# Patient Record
Sex: MALE | Race: WHITE | HISPANIC OR LATINO | Employment: FULL TIME | URBAN - METROPOLITAN AREA
[De-identification: names, ages, dates, MRNs, and addresses within clinical notes are randomized per-mention and may not be internally consistent; named-entity substitution may affect disease eponyms.]

---

## 2017-02-25 ENCOUNTER — APPOINTMENT (OUTPATIENT)
Dept: RADIOLOGY | Facility: MEDICAL CENTER | Age: 22
End: 2017-02-25
Attending: EMERGENCY MEDICINE
Payer: COMMERCIAL

## 2017-02-25 ENCOUNTER — HOSPITAL ENCOUNTER (EMERGENCY)
Facility: MEDICAL CENTER | Age: 22
End: 2017-02-25
Attending: EMERGENCY MEDICINE
Payer: COMMERCIAL

## 2017-02-25 VITALS
HEIGHT: 69 IN | RESPIRATION RATE: 16 BRPM | HEART RATE: 90 BPM | OXYGEN SATURATION: 98 % | DIASTOLIC BLOOD PRESSURE: 70 MMHG | BODY MASS INDEX: 20.73 KG/M2 | WEIGHT: 139.99 LBS | TEMPERATURE: 99.5 F | SYSTOLIC BLOOD PRESSURE: 124 MMHG

## 2017-02-25 DIAGNOSIS — S43.005A SHOULDER DISLOCATION, LEFT, INITIAL ENCOUNTER: ICD-10-CM

## 2017-02-25 PROCEDURE — 73030 X-RAY EXAM OF SHOULDER: CPT | Mod: LT

## 2017-02-25 PROCEDURE — 700102 HCHG RX REV CODE 250 W/ 637 OVERRIDE(OP): Mod: EDC | Performed by: EMERGENCY MEDICINE

## 2017-02-25 PROCEDURE — 99284 EMERGENCY DEPT VISIT MOD MDM: CPT | Mod: EDC

## 2017-02-25 PROCEDURE — A9270 NON-COVERED ITEM OR SERVICE: HCPCS | Mod: EDC | Performed by: EMERGENCY MEDICINE

## 2017-02-25 PROCEDURE — 23650 CLTX SHO DSLC W/MNPJ WO ANES: CPT | Mod: EDC

## 2017-02-25 RX ORDER — HYDROCODONE BITARTRATE AND ACETAMINOPHEN 5; 325 MG/1; MG/1
1 TABLET ORAL EVERY 6 HOURS PRN
Qty: 20 TAB | Refills: 0 | Status: SHIPPED | OUTPATIENT
Start: 2017-02-25 | End: 2018-02-16

## 2017-02-25 RX ORDER — HYDROCODONE BITARTRATE AND ACETAMINOPHEN 5; 325 MG/1; MG/1
1 TABLET ORAL ONCE
Status: COMPLETED | OUTPATIENT
Start: 2017-02-25 | End: 2017-02-25

## 2017-02-25 RX ADMIN — HYDROCODONE BITARTRATE AND ACETAMINOPHEN 1 TABLET: 5; 325 TABLET ORAL at 09:46

## 2017-02-25 NOTE — ED AVS SNAPSHOT
Get-n-Post Access Code: [email or web address]  Expires: 3/27/2017 10:39 AM    Your email address is not on file at [facility].  Email Addresses are required for you to sign up for Get-n-Post, please contact [phone] to verify your personal information and to provide your email address prior to attempting to register for Get-n-Post.    Emir Aguilar  [address] [place] [address]    Get-n-Post  A secure, online tool to manage your health information     [facility] Get-[employer]® is a secure, online tool that connects you to your personalized health information from the privacy of your home -- day or night - making it very easy for you to manage your healthcare. Once the activation process is completed, you can even access your medical information using the Get-n-Post katarzyna, which is available for free in the Apple Katarzyna store or Google Play store.     To learn more about Get-n-Post, visit [email or web address]    There are two levels of access available (as shown below):   My Chart Features  [facility] Primary Care Doctor [facility]  Specialists [facility]  Urgent  Care Non-[facility] Primary Care Doctor   Email your healthcare team securely and privately 24/7 X X X    Manage appointments: schedule your next appointment; view details of past/upcoming appointments X      Request prescription refills. X      View recent personal medical records, including lab and immunizations X X X X   View health record, including health history, allergies, medications X X X X   Read reports about your outpatient visits, procedures, consult and ER notes X X X X   See your discharge summary, which is a recap of your hospital and/or ER visit that includes your diagnosis, lab results, and care plan X X  X     How to register for Get-n-Post:  Once your e-mail address has been verified, follow the following steps to sign up for Get-n-Post.     1. Go to  [email or web address]  2. Click on the Sign Up Now box, which takes you to the New Member Sign Up page. You will  need to provide the following information:  a. Enter your [employer] Access Code exactly as it appears at the top of this page. (You will not need to use this code after you’ve completed the sign-up process. If you do not sign up before the expiration date, you must request a new code.)   b. Enter your date of birth.   c. Enter your home email address.   d. Click Submit, and follow the next screen’s instructions.  3. Create a My Pick Boxt ID. This will be your [employer] login ID and cannot be changed, so think of one that is secure and easy to remember.  4. Create a [employer] password. You can change your password at any time.  5. Enter your Password Reset Question and Answer. This can be used at a later time if you forget your password.   6. Enter your e-mail address. This allows you to receive e-mail notifications when new information is available in [employer].  7. Click Sign Up. You can now view your health information.    For assistance activating your [employer] account, call [phone]

## 2017-02-25 NOTE — DISCHARGE INSTRUCTIONS
Shoulder Dislocation  Your shoulder is made up of three bones: the collar bone (clavicle); the shoulder blade (scapula), which includes the socket (glenoid cavity); and the upper arm bone (humerus). Your shoulder joint is the place where these bones meet. Strong, fibrous tissues hold these bones together (ligaments). Muscles and strong, fibrous tissues that connect the muscles to these bones (tendons) allow your arm to move through this joint. The range of motion of your shoulder joint is more extensive than most of your other joints, and the glenoid cavity is very shallow. That is the reason that your shoulder joint is one of the most unstable joints in your body. It is far more prone to dislocation than your other joints. Shoulder dislocation is when your humerus is forced out of your shoulder joint.  CAUSES  Shoulder dislocation is caused by a forceful impact on your shoulder. This impact usually is from an injury, such as a sports injury or a fall.  SYMPTOMS  Symptoms of shoulder dislocation include:  · Deformity of your shoulder.  · Intense pain.  · Inability to move your shoulder joint.  · Numbness, weakness, or tingling around your shoulder joint (your neck or down your arm).  · Bruising or swelling around your shoulder.  DIAGNOSIS  In order to diagnose a dislocated shoulder, your caregiver will perform a physical exam. Your caregiver also may have an X-ray exam done to see if you have any broken bones. Magnetic resonance imaging (MRI) is a procedure that sometimes is done to help your caregiver see any damage to the soft tissues around your shoulder, particularly your rotator cuff tendons. Additionally, your caregiver also may have electromyography done to measure the electrical discharges produced in your muscles if you have signs or symptoms of nerve damage.  TREATMENT  A shoulder dislocation is treated by placing the humerus back in the joint (reduction). Your caregiver does this either manually (closed  reduction), by moving your humerus back into the joint through manipulation, or through surgery (open reduction). When your humerus is back in place, severe pain should improve almost immediately.  You also may need to have surgery if you have a weak shoulder joint or ligaments, and you have recurring shoulder dislocations, despite rehabilitation. In rare cases, surgery is necessary if your nerves or blood vessels are damaged during the dislocation.  After your reduction, your arm will be placed in a shoulder immobilizer or sling to keep it from moving. Your caregiver will have you wear your shoulder immobilizer or sling for 3 days to 3 weeks, depending on how serious your dislocation is. When your shoulder immobilizer or sling is removed, your caregiver may prescribe physical therapy to help improve the range of motion in your shoulder joint.  HOME CARE INSTRUCTIONS   The following measures can help to reduce pain and speed up the healing process:  · Rest your injured joint. Do not move it. Avoid activities similar to the one that caused your injury.  · Apply ice to your injured joint for the first day or two after your reduction or as directed by your caregiver. Applying ice helps to reduce inflammation and pain.  ¨ Put ice in a plastic bag.  ¨ Place a towel between your skin and the bag.  ¨ Leave the ice on for 15-20 minutes at a time, every 2 hours while you are awake.  · Exercise your hand by squeezing a soft ball. This helps to eliminate stiffness and swelling in your hand and wrist.  · Take over-the-counter or prescription medicine for pain or discomfort as told by your caregiver.  SEEK IMMEDIATE MEDICAL CARE IF:   · Your shoulder immobilizer or sling becomes damaged.  · Your pain becomes worse rather than better.  · You lose feeling in your arm or hand, or they become white and cold.  MAKE SURE YOU:   · Understand these instructions.  · Will watch your condition.  · Will get help right away if you are not  doing well or get worse.     This information is not intended to replace advice given to you by your health care provider. Make sure you discuss any questions you have with your health care provider.     Document Released: 09/12/2002 Document Revised: 01/08/2016 Document Reviewed: 04/11/2016  [employer] Interactive Patient Education ©2016 Elsevier Inc.

## 2017-02-25 NOTE — ED AVS SNAPSHOT
2/25/2017          Emir Aguilar  [address] [place] [address]    Dear Emir:    [facility] wants to ensure your discharge home is safe and you or your loved ones have had all your questions answered regarding your care after you leave the hospital.    You may receive a telephone call within two days of your discharge.  This call is to make certain you understand your discharge instructions as well as ensure we provided you with the best care possible during your stay with us.     The call will only last approximately 3-5 minutes and will be done by a nurse.    Once again, we want to ensure your discharge home is safe and that you have a clear understanding of any next steps in your care.  If you have any questions or concerns, please do not hesitate to contact us, we are here for you.  Thank you for choosing [facility] for your healthcare needs.    Sincerely,    Dwight Kowalski    [facility]

## 2017-02-25 NOTE — ED NOTES
Pt rec'd dc instructions about shoulder dislocation and stated understanding.  Rec'd script for norco with timing and dosing amount.  Will follow up with pcp or return for worse symptoms.

## 2017-02-25 NOTE — ED PROVIDER NOTES
"ED Provider Note      CHIEF COMPLAINT   Chief Complaint   Patient presents with   • Shoulder Pain     pt amb to triage, reports stiped by dog this AM. fell onto L shoulder, pt splinting L shoulder, 7/10.  unable to stand still.         HPI   Emir Aguilar is a 21 y.o. male who presents with left shoulder pain. Patient tripped over his dog this morning. Fell on his left shoulder. He reports currently tripping on mushrooms. He has pain over his left shoulder. Throbbing. Worse with any movement. Severe. No numbness tingling weakness. Denies head injury neck pain or back pain. He has dislocated his shoulder previously.    REVIEW OF SYSTEMS   Pertinent negative: As above    PAST MEDICAL HISTORY   Shoulder dislocation    SOCIAL HISTORY  Social History   Substance Use Topics   • Smoking status: Current Every Day Smoker     Types: Cigarettes   • Smokeless tobacco: None   • Alcohol Use: Yes      Comment: occ       ALLERGIES   See chart    PHYSICAL EXAM  VITAL SIGNS: /67 mmHg  Pulse 87  Temp(Src) 36.9 °C (98.4 °F)  Resp 16  Ht 1.753 m (5' 9\")  Wt 63.5 kg (139 lb 15.9 oz)  BMI 20.66 kg/m2  SpO2 98%  Head: Atraumatic  Eyes: Eyes normal inspection  Neck: has full range of motion, normal inspection.  Constitutional: Anxious  Cardiovascular: Normal heart rate. Pulses strong radial  Thorax & Lungs: No respiratory distress  Skin: Intact  Musculoskeletal: Holding left shoulder internally rotated and abducted. There is a sulcus sign consistent with anterior inferior dislocation. Limited range of motion.  Neurologic:  Normal sensory and motor    RADIOLOGY/PROCEDURES  Joint Reduction Procedure Note    Indication: Left shoulder Joint dislocation    Consent: The patient provided verbal consent for this procedure.    Procedure: The pre-reduction exam showed distal perfusion & neurologic function to be normal. The patient was placed in the appropriate position. Reduction of the left shoulder was performed by scapular " manipulation. Post reduction films were obtained and revealed satisfactory reduction. A post-reduction exam revealed distal perfusion & neurologic function to be normal. The affected area was immobilized with a sling.    The patient tolerated the procedure well.    Complications: None      DX-SHOULDER 2+ LEFT   Final Result      1.  There is no gross plain film evidence of dislocation or acute fracture.            Imaging is interpreted by radiologist reviewed by me    COURSE & MEDICAL DECISION MAKING  Patient presents with obvious left shoulder dislocation with history of this. The shoulder is reduced on exam as described above. He had symptomatic improvement. He was given one Norco orally. X-ray of the left shoulder was obtained and was negative. Given recurrent dislocation he is referred to Dr. Ruelas. He is placed in a sling to use for the next 2 days and then range of motion exercises were advised and he is given a prescription for Norco at home. He is to return to the ER for uncontrolled pain or concern.      FINAL IMPRESSION  1. Shoulder dislocation  2. Closed reduction left shoulder dislocation by me      This dictation was created using voice recognition software. The accuracy of the dictation is limited to the abilities of the software. I expect there may be some errors of grammar and possibly content. The nursing notes were reviewed and certain aspects of this information were incorporated into this note.      Electronically signed by: Molina Murillo, 2/25/2017 10:09 AM

## 2017-02-25 NOTE — ED NOTES
Chief Complaint   Patient presents with   • Shoulder Pain     pt amb to triage, reports stiped by dog this AM. fell onto L shoulder, pt splinting L shoulder, 7/10.  unable to stand still.     asked pt to inform staff of any significant changes.  Pt refuses to sit.

## 2017-02-25 NOTE — ED AVS SNAPSHOT
Home Care Instructions                                                                                                                Emir Aguilar   MRN: [id number]    Department:  [facility], Emergency Dept   Date of Visit:  2/25/2017            [facility], Emergency Dept    [address]    [facility] [address]    Phone:  [phone]      You were seen by     Molina Murillo M.D.      Your Diagnosis Was     Shoulder dislocation, left, initial encounter     S43.005A       These are the medications you received during your hospitalization from 02/25/2017 0831 to 02/25/2017 1043     Date/Time Order Dose Route Action    02/25/2017 0946 hydrocodone-acetaminophen (NORCO) 5-325 MG per tablet 1 Tab 1 Tab Oral Given      Follow-up Information     1. Follow up with Giovani Del Rio M.D. In 1 week.    Specialty:  Orthopaedics    Contact information    [address]  [address]  [facility] [id number] [phone]        Medication Information     Review all of your home medications and newly ordered medications with your primary doctor and/or pharmacist as soon as possible. Follow medication instructions as directed by your doctor and/or pharmacist.     Please keep your complete medication list with you and share with your physician. Update the information when medications are discontinued, doses are changed, or new medications (including over-the-counter products) are added; and carry medication information at all times in the event of emergency situations.               Medication List      START taking these medications        Instructions    hydrocodone-acetaminophen 5-325 MG Tabs per tablet   Commonly known as:  NORCO    Take 1 Tab by mouth every 6 hours as needed.   Dose:  1 Tab               Procedures and tests performed during your visit     DX-SHOULDER 2+ LEFT    SLING        Discharge Instructions       Shoulder Dislocation  Your shoulder is made up of three bones: the  collar bone (clavicle); the shoulder blade (scapula), which includes the socket (glenoid cavity); and the upper arm bone (humerus). Your shoulder joint is the place where these bones meet. Strong, fibrous tissues hold these bones together (ligaments). Muscles and strong, fibrous tissues that connect the muscles to these bones (tendons) allow your arm to move through this joint. The range of motion of your shoulder joint is more extensive than most of your other joints, and the glenoid cavity is very shallow. That is the reason that your shoulder joint is one of the most unstable joints in your body. It is far more prone to dislocation than your other joints. Shoulder dislocation is when your humerus is forced out of your shoulder joint.  CAUSES  Shoulder dislocation is caused by a forceful impact on your shoulder. This impact usually is from an injury, such as a sports injury or a fall.  SYMPTOMS  Symptoms of shoulder dislocation include:  · Deformity of your shoulder.  · Intense pain.  · Inability to move your shoulder joint.  · Numbness, weakness, or tingling around your shoulder joint (your neck or down your arm).  · Bruising or swelling around your shoulder.  DIAGNOSIS  In order to diagnose a dislocated shoulder, your caregiver will perform a physical exam. Your caregiver also may have an X-ray exam done to see if you have any broken bones. Magnetic resonance imaging (MRI) is a procedure that sometimes is done to help your caregiver see any damage to the soft tissues around your shoulder, particularly your rotator cuff tendons. Additionally, your caregiver also may have electromyography done to measure the electrical discharges produced in your muscles if you have signs or symptoms of nerve damage.  TREATMENT  A shoulder dislocation is treated by placing the humerus back in the joint (reduction). Your caregiver does this either manually (closed reduction), by moving your humerus back into the joint through  manipulation, or through surgery (open reduction). When your humerus is back in place, severe pain should improve almost immediately.  You also may need to have surgery if you have a weak shoulder joint or ligaments, and you have recurring shoulder dislocations, despite rehabilitation. In rare cases, surgery is necessary if your nerves or blood vessels are damaged during the dislocation.  After your reduction, your arm will be placed in a shoulder immobilizer or sling to keep it from moving. Your caregiver will have you wear your shoulder immobilizer or sling for 3 days to 3 weeks, depending on how serious your dislocation is. When your shoulder immobilizer or sling is removed, your caregiver may prescribe physical therapy to help improve the range of motion in your shoulder joint.  HOME CARE INSTRUCTIONS   The following measures can help to reduce pain and speed up the healing process:  · Rest your injured joint. Do not move it. Avoid activities similar to the one that caused your injury.  · Apply ice to your injured joint for the first day or two after your reduction or as directed by your caregiver. Applying ice helps to reduce inflammation and pain.  ¨ Put ice in a plastic bag.  ¨ Place a towel between your skin and the bag.  ¨ Leave the ice on for 15-20 minutes at a time, every 2 hours while you are awake.  · Exercise your hand by squeezing a soft ball. This helps to eliminate stiffness and swelling in your hand and wrist.  · Take over-the-counter or prescription medicine for pain or discomfort as told by your caregiver.  SEEK IMMEDIATE MEDICAL CARE IF:   · Your shoulder immobilizer or sling becomes damaged.  · Your pain becomes worse rather than better.  · You lose feeling in your arm or hand, or they become white and cold.  MAKE SURE YOU:   · Understand these instructions.  · Will watch your condition.  · Will get help right away if you are not doing well or get worse.     This information is not intended to  replace advice given to you by your health care provider. Make sure you discuss any questions you have with your health care provider.     Document Released: 09/12/2002 Document Revised: 01/08/2016 Document Reviewed: 04/11/2016  Elsevier Interactive Patient Education ©2016 [employer]            Patient Information     Patient Information    Following emergency treatment: all patient requiring follow-up care must return either to a private physician or a clinic if your condition worsens before you are able to obtain further medical attention, please return to the emergency room.     Billing Information    At [facility], we work to make the billing process streamlined for our patients.  Our Representatives are here to answer any questions you may have regarding your hospital bill.  If you have insurance coverage and have supplied your insurance information to us, we will submit a claim to your insurer on your behalf.  Should you have any questions regarding your bill, we can be reached online or by phone as follows:  Online: You are able pay your bills online or live chat with our representatives about any billing questions you may have. We are here to help Monday - Friday from 8:00am to 7:30pm and 9:00am - 12:00pm on Saturdays.  Please visit [email or web address]  for more information.   Phone:  [phone] or [phone]    Please note that your emergency physician, surgeon, pathologist, radiologist, anesthesiologist, and other specialists are not employed by [facility] and will therefore bill separately for their services.  Please contact them directly for any questions concerning their bills at the numbers below:     Emergency Physician Services:  [phone]  [facility]:  [phone]  [facility]:  [phone]  [facility]:  [phone]    1. Your final bill may vary from the amount quoted upon discharge if all procedures  are not complete at that time, or if your doctor has additional procedures of which we are not aware. You will receive an additional bill if you return to the Emergency Department at [facility] for suture removal regardless of the facility of which the sutures were placed.     2. Please arrange for settlement of this account at the emergency registration.    3. All self-pay accounts are due in full at the time of treatment.  If you are unable to meet this obligation then payment is expected within 4-5 days.     4. If you have had radiology studies (CT, X-ray, Ultrasound, MRI), you have received a preliminary result during your emergency department visit. Please contact the radiology department [phone] to receive a copy of your final result. Please discuss the Final result with your primary physician or with the follow up physician provided.     Crisis Hotline:  [place] Crisis Hotline:  6-251-DNFUWRK or 1-280.142.1050  [place] Crisis Hotline:    1-954.846.9777 or 251-931-5817         ED Discharge Follow Up Questions    1. In order to provide you with very good care, we would like to follow up with a phone call in the next few days.  May we have your permission to contact you?     YES /  NO    2. What is the best phone number to call you? (       )_____-__________    3. What is the best time to call you?      Morning  /  Afternoon  /  Evening                   Patient Signature:  ____________________________________________________________    Date:  ____________________________________________________________

## 2017-02-28 ENCOUNTER — HOSPITAL ENCOUNTER (EMERGENCY)
Facility: MEDICAL CENTER | Age: 22
End: 2017-02-28
Attending: EMERGENCY MEDICINE
Payer: COMMERCIAL

## 2017-02-28 VITALS
RESPIRATION RATE: 16 BRPM | SYSTOLIC BLOOD PRESSURE: 115 MMHG | HEART RATE: 88 BPM | WEIGHT: 145 LBS | TEMPERATURE: 98.5 F | BODY MASS INDEX: 21.48 KG/M2 | DIASTOLIC BLOOD PRESSURE: 69 MMHG | OXYGEN SATURATION: 98 % | HEIGHT: 69 IN

## 2017-02-28 DIAGNOSIS — S43.005D SHOULDER DISLOCATION, LEFT, SUBSEQUENT ENCOUNTER: ICD-10-CM

## 2017-02-28 DIAGNOSIS — M25.512 LEFT SHOULDER PAIN, UNSPECIFIED CHRONICITY: ICD-10-CM

## 2017-02-28 PROCEDURE — 99283 EMERGENCY DEPT VISIT LOW MDM: CPT

## 2017-02-28 RX ORDER — HYDROCODONE BITARTRATE AND ACETAMINOPHEN 5; 325 MG/1; MG/1
1-2 TABLET ORAL EVERY 4 HOURS PRN
Qty: 10 TAB | Refills: 0 | Status: SHIPPED | OUTPATIENT
Start: 2017-02-28 | End: 2018-02-16

## 2017-02-28 NOTE — ED AVS SNAPSHOT
Home Care Instructions                                                                                                                Emir Aguilar   MRN: [id number]    Department:  [facility], Emergency Dept   Date of Visit:  2/28/2017            [facility], Emergency Dept    [address]    [facility] [address]    Phone:  [phone]      You were seen by     Jarett Davila M.D.      Your Diagnosis Was     Shoulder dislocation, left, subsequent encounter     S43.005D       Follow-up Information     1. Schedule an appointment as soon as possible for a visit with Giovain Del Rio M.D..    Specialty:  Orthopaedics    Contact information    [address]  [address]  [facility] [address]  [phone]        Medication Information     Review all of your home medications and newly ordered medications with your primary doctor and/or pharmacist as soon as possible. Follow medication instructions as directed by your doctor and/or pharmacist.     Please keep your complete medication list with you and share with your physician. Update the information when medications are discontinued, doses are changed, or new medications (including over-the-counter products) are added; and carry medication information at all times in the event of emergency situations.               Medication List      ASK your doctor about these medications        Instructions    * hydrocodone-acetaminophen 5-325 MG Tabs per tablet   What changed:  Another medication with the same name was added. Make sure you understand how and when to take each.   Commonly known as:  NORCO   Ask about: Which instructions should I use?    Take 1 Tab by mouth every 6 hours as needed.   Dose:  1 Tab       * hydrocodone-acetaminophen 5-325 MG Tabs per tablet   What changed:  You were already taking a medication with the same name, and this prescription was added. Make sure you understand how and when to take each.   Commonly known  as:  NORCO   Ask about: Which instructions should I use?    Take 1-2 Tabs by mouth every four hours as needed.   Dose:  1-2 Tab       * Notice:  This list has 2 medication(s) that are the same as other medications prescribed for you. Read the directions carefully, and ask your doctor or other care provider to review them with you.              Discharge Instructions       Shoulder Pain  The shoulder is the joint that connects your arms to your body. The bones that form the shoulder joint include the upper arm bone (humerus), the shoulder blade (scapula), and the collarbone (clavicle). The top of the humerus is shaped like a ball and fits into a rather flat socket on the scapula (glenoid cavity). A combination of muscles and strong, fibrous tissues that connect muscles to bones (tendons) support your shoulder joint and hold the ball in the socket. Small, fluid-filled sacs (bursae) are located in different areas of the joint. They act as cushions between the bones and the overlying soft tissues and help reduce friction between the gliding tendons and the bone as you move your arm. Your shoulder joint allows a wide range of motion in your arm. This range of motion allows you to do things like scratch your back or throw a ball. However, this range of motion also makes your shoulder more prone to pain from overuse and injury.  Causes of shoulder pain can originate from both injury and overuse and usually can be grouped in the following four categories:  · Redness, swelling, and pain (inflammation) of the tendon (tendinitis) or the bursae (bursitis).  · Instability, such as a dislocation of the joint.  · Inflammation of the joint (arthritis).  · Broken bone (fracture).  HOME CARE INSTRUCTIONS   · Apply ice to the sore area.  ¨ Put ice in a plastic bag.  ¨ Place a towel between your skin and the bag.  ¨ Leave the ice on for 15-20 minutes, 3-4 times per day for the first 2 days, or as directed by your health care  provider.  · Stop using cold packs if they do not help with the pain.  · If you have a shoulder sling or immobilizer, wear it as long as your caregiver instructs. Only remove it to shower or bathe. Move your arm as little as possible, but keep your hand moving to prevent swelling.  · Squeeze a soft ball or foam pad as much as possible to help prevent swelling.  · Only take over-the-counter or prescription medicines for pain, discomfort, or fever as directed by your caregiver.  SEEK MEDICAL CARE IF:   · Your shoulder pain increases, or new pain develops in your arm, hand, or fingers.  · Your hand or fingers become cold and numb.  · Your pain is not relieved with medicines.  SEEK IMMEDIATE MEDICAL CARE IF:   · Your arm, hand, or fingers are numb or tingling.  · Your arm, hand, or fingers are significantly swollen or turn white or blue.  MAKE SURE YOU:   · Understand these instructions.  · Will watch your condition.  · Will get help right away if you are not doing well or get worse.     This information is not intended to replace advice given to you by your health care provider. Make sure you discuss any questions you have with your health care provider.     Document Released: 09/27/2006 Document Revised: 01/08/2016 Document Reviewed: 04/11/2016  [employer] Interactive Patient Education ©2016 [employer]            Patient Information     Patient Information    Following emergency treatment: all patient requiring follow-up care must return either to a private physician or a clinic if your condition worsens before you are able to obtain further medical attention, please return to the emergency room.     Billing Information    At [facility], we work to make the billing process streamlined for our patients.  Our Representatives are here to answer any questions you may have regarding your hospital bill.  If you have insurance coverage and have supplied your insurance information to us, we will submit a claim to your  insurer on your behalf.  Should you have any questions regarding your bill, we can be reached online or by phone as follows:  Online: You are able pay your bills online or live chat with our representatives about any billing questions you may have. We are here to help Monday - Friday from 8:00am to 7:30pm and 9:00am - 12:00pm on Saturdays.  Please visit [email or web address]  for more information.   Phone:  [phone] or [phone]    Please note that your emergency physician, surgeon, pathologist, radiologist, anesthesiologist, and other specialists are not employed by [facility] and will therefore bill separately for their services.  Please contact them directly for any questions concerning their bills at the numbers below:     Emergency Physician Services:  [phone]  [facility]:  [phone]  [facility]:  [phone]  [facility]:  [phone]    1. Your final bill may vary from the amount quoted upon discharge if all procedures are not complete at that time, or if your doctor has additional procedures of which we are not aware. You will receive an additional bill if you return to the Emergency Department at [facility] for suture removal regardless of the facility of which the sutures were placed.     2. Please arrange for settlement of this account at the emergency registration.    3. All self-pay accounts are due in full at the time of treatment.  If you are unable to meet this obligation then payment is expected within 4-5 days.     4. If you have had radiology studies (CT, X-ray, Ultrasound, MRI), you have received a preliminary result during your emergency department visit. Please contact the radiology department [phone] to receive a copy of your final result. Please discuss the Final result with your primary physician or with the follow up physician provided.     Crisis Hotline:  National Crisis Hotline:   0-012-EFWDNOE or 1-455.854.1114  [place] Crisis Hotline:    1-648.780.1439 or 661-746-9988         ED Discharge Follow Up Questions    1. In order to provide you with very good care, we would like to follow up with a phone call in the next few days.  May we have your permission to contact you?     YES /  NO    2. What is the best phone number to call you? (       )_____-__________    3. What is the best time to call you?      Morning  /  Afternoon  /  Evening                   Patient Signature:  ____________________________________________________________    Date:  ____________________________________________________________

## 2017-02-28 NOTE — ED AVS SNAPSHOT
LogicSource Access Code: [email or web address]  Expires: 3/27/2017 10:39 AM    Your email address is not on file at [facility].  Email Addresses are required for you to sign up for [employer], please contact [phone] to verify your personal information and to provide your email address prior to attempting to register for LogicSource.    Emir Aguilar  [address] [place] [address]    LogicSource  A secure, online tool to manage your health information     [employer]’s LogicSource® is a secure, online tool that connects you to your personalized health information from the privacy of your home -- day or night - making it very easy for you to manage your healthcare. Once the activation process is completed, you can even access your medical information using the LogicSource katarzyna, which is available for free in the Apple Katarzyna store or Google Play store.     To learn more about LogicSource, visit [email or web address]    There are two levels of access available (as shown below):   My Chart Features  [facility] Primary Care Doctor [facility]  Specialists [facility]  Urgent  Care Non-[facility] Primary Care Doctor   Email your healthcare team securely and privately 24/7 X X X    Manage appointments: schedule your next appointment; view details of past/upcoming appointments X      Request prescription refills. X      View recent personal medical records, including lab and immunizations X X X X   View health record, including health history, allergies, medications X X X X   Read reports about your outpatient visits, procedures, consult and ER notes X X X X   See your discharge summary, which is a recap of your hospital and/or ER visit that includes your diagnosis, lab results, and care plan X X  X     How to register for LogicSource:  Once your e-mail address has been verified, follow the following steps to sign up for LogicSource.     1. Go to  [email or web address]  2. Click on the Sign Up Now box, which takes you to the New Member Sign Up page. You will  need to provide the following information:  a. Enter your [employer] Access Code exactly as it appears at the top of this page. (You will not need to use this code after you’ve completed the sign-up process. If you do not sign up before the expiration date, you must request a new code.)   b. Enter your date of birth.   c. Enter your home email address.   d. Click Submit, and follow the next screen’s instructions.  3. Create a [employer] medicalt ID. This will be your [employer] login ID and cannot be changed, so think of one that is secure and easy to remember.  4. Create a [employer] password. You can change your password at any time.  5. Enter your Password Reset Question and Answer. This can be used at a later time if you forget your password.   6. Enter your e-mail address. This allows you to receive e-mail notifications when new information is available in [employer].  7. Click Sign Up. You can now view your health information.    For assistance activating your [employer] account, call [phone]

## 2017-02-28 NOTE — ED AVS SNAPSHOT
2/28/2017          Emir Aguilar  [address] [place] [address]    Dear Emir:    [facility] wants to ensure your discharge home is safe and you or your loved ones have had all your questions answered regarding your care after you leave the hospital.    You may receive a telephone call within two days of your discharge.  This call is to make certain you understand your discharge instructions as well as ensure we provided you with the best care possible during your stay with us.     The call will only last approximately 3-5 minutes and will be done by a nurse.    Once again, we want to ensure your discharge home is safe and that you have a clear understanding of any next steps in your care.  If you have any questions or concerns, please do not hesitate to contact us, we are here for you.  Thank you for choosing [facility] for your healthcare needs.    Sincerely,    Dwight Kowalski    [facility]

## 2017-03-01 NOTE — ED NOTES
Discharge instructions provided to pt.  Pt states understanding.  Pt states all questions have been answered.  Copy of discharge provided to pt.  Signed copy in chart.  Prescriptions provided to pt. x1 with work note. Pt states that all personal belongings are in possession. Pt upset with billing for another ER visit and not being cleared to return to work. Finance explaining pt's benefits.

## 2017-03-01 NOTE — ED NOTES
22 y/o male ambulate to triage  Chief Complaint   Patient presents with   • Letter for School/Work     Pt was seen here Saturday for shoulder dislocation.  He needs to know when he can return to work and needs a note.  Pt works repairing computers

## 2017-03-01 NOTE — DISCHARGE INSTRUCTIONS
Shoulder Pain  The shoulder is the joint that connects your arms to your body. The bones that form the shoulder joint include the upper arm bone (humerus), the shoulder blade (scapula), and the collarbone (clavicle). The top of the humerus is shaped like a ball and fits into a rather flat socket on the scapula (glenoid cavity). A combination of muscles and strong, fibrous tissues that connect muscles to bones (tendons) support your shoulder joint and hold the ball in the socket. Small, fluid-filled sacs (bursae) are located in different areas of the joint. They act as cushions between the bones and the overlying soft tissues and help reduce friction between the gliding tendons and the bone as you move your arm. Your shoulder joint allows a wide range of motion in your arm. This range of motion allows you to do things like scratch your back or throw a ball. However, this range of motion also makes your shoulder more prone to pain from overuse and injury.  Causes of shoulder pain can originate from both injury and overuse and usually can be grouped in the following four categories:  · Redness, swelling, and pain (inflammation) of the tendon (tendinitis) or the bursae (bursitis).  · Instability, such as a dislocation of the joint.  · Inflammation of the joint (arthritis).  · Broken bone (fracture).  HOME CARE INSTRUCTIONS   · Apply ice to the sore area.  ¨ Put ice in a plastic bag.  ¨ Place a towel between your skin and the bag.  ¨ Leave the ice on for 15-20 minutes, 3-4 times per day for the first 2 days, or as directed by your health care provider.  · Stop using cold packs if they do not help with the pain.  · If you have a shoulder sling or immobilizer, wear it as long as your caregiver instructs. Only remove it to shower or bathe. Move your arm as little as possible, but keep your hand moving to prevent swelling.  · Squeeze a soft ball or foam pad as much as possible to help prevent swelling.  · Only take  over-the-counter or prescription medicines for pain, discomfort, or fever as directed by your caregiver.  SEEK MEDICAL CARE IF:   · Your shoulder pain increases, or new pain develops in your arm, hand, or fingers.  · Your hand or fingers become cold and numb.  · Your pain is not relieved with medicines.  SEEK IMMEDIATE MEDICAL CARE IF:   · Your arm, hand, or fingers are numb or tingling.  · Your arm, hand, or fingers are significantly swollen or turn white or blue.  MAKE SURE YOU:   · Understand these instructions.  · Will watch your condition.  · Will get help right away if you are not doing well or get worse.     This information is not intended to replace advice given to you by your health care provider. Make sure you discuss any questions you have with your health care provider.     Document Released: 09/27/2006 Document Revised: 01/08/2016 Document Reviewed: 04/11/2016  [employer] Interactive Patient Education ©2016 [employer]

## 2017-03-01 NOTE — ED PROVIDER NOTES
ED Provider Note    CHIEF COMPLAINT  Chief Complaint   Patient presents with   • Letter for School/Work        HPI  Emir Aguilar is a 21 y.o. male who presents to the ED complaining of continued left shoulder pain and needs a letter for work. Patient has had multiple dislocations apparently was seen on Friday the 25th for dislocation of his left shoulder. Patient returns today because they will not let him back to work without some sort of a work note. Patient describes continued pain. He is actually run out of some of his medications and requests a refill as well. Patient denies any tingling, fevers, chills, or any other symptoms.    REVIEW OF SYSTEMS  See HPI for further details. All other systems are negative.     PAST MEDICAL HISTORY  No past medical history on file.    FAMILY HISTORY  No family history on file.  Patient's family history has been discussed and is been found to be noncontributory to his present illness  SOCIAL HISTORY  Social History     Social History   • Marital Status: Single     Spouse Name: N/A   • Number of Children: N/A   • Years of Education: N/A     Social History Main Topics   • Smoking status: Current Every Day Smoker     Types: Cigarettes   • Smokeless tobacco: Not on file   • Alcohol Use: Yes      Comment: occ   • Drug Use: Yes     Special: Oral      Comment: marijuana, mushrooms   • Sexual Activity: No      Comment: he  will be in 9th grade     Other Topics Concern   • Not on file     Social History Narrative    ** Merged History Encounter **                   SURGICAL HISTORY  No past surgical history on file.    CURRENT MEDICATIONS   Home Medications     **Home medications have not yet been reviewed for this encounter**        No current facility-administered medications on file prior to encounter.     Current Outpatient Prescriptions on File Prior to Encounter   Medication Sig Dispense Refill   • hydrocodone-acetaminophen (NORCO) 5-325 MG Tab per tablet Take 1 Tab by mouth  "every 6 hours as needed. 20 Tab 0         ALLERGIES   No Known Allergies    PHYSICAL EXAM  VITAL SIGNS: /69 mmHg  Pulse 88  Temp(Src) 36.9 °C (98.5 °F)  Resp 16  Ht 1.753 m (5' 9\")  Wt 65.772 kg (145 lb)  BMI 21.40 kg/m2  SpO2 98%   Pulse Ox interpretation. Nonhypoxic    Constitutional: Well developed, Well nourished, No acute distress, Non-toxic appearance.   Cardiovascular: Regular rate and rhythm without murmurs gallops or rubs.   Thorax & Lungs: Lungs are clear to auscultation bilaterally, there are no wheezes no rales. Chest wall is nontender.  Abdomen: Soft, nontender nondistended. Bowel sounds are present.   Skin: Warm, Dry, No erythema,   Musculoskeletal: Intact distal pulses, no clubbing, no cyanosis, no edema. Mild tenderness about the head of the humerus, but otherwise no deformities. Distal pulses are intact. I did not raise the patient's arm.  Neurologic: Alert & oriented x 3, Normal motor function, Normal sensory function, No focal deficits noted.         COURSE & MEDICAL DECISION MAKING  Pertinent Labs & Imaging studies reviewed. (See chart for details)  Patient presents for evaluation. Clinically, the patient is stable. Given the patient a letter for work for limited duty until cleared by orthopedic surgeon. I have done a narcotics check on him. He still had 2 prescriptions the last 2 years. I'll give him re-prescription for Norco. Recommend follow-up with the orthopedic surgeon. I was referred to on the last visit.    FINAL IMPRESSION  1. Shoulder dislocation, left, subsequent encounter    2. Left shoulder pain, unspecified chronicity         The patient will return for new or worsening symptoms and is stable at the time of discharge.    The patient is referred to a primary physician for blood pressure management, diabetic screening, and for all other preventative health concerns.    DISPOSITION:  Patient will be discharged home in stable condition.    FOLLOW UP:  Giovani Del Rio, " M.D.  [address]  [address]  [place] [address]  [phone]    Schedule an appointment as soon as possible for a visit        OUTPATIENT MEDICATIONS:  New Prescriptions    HYDROCODONE-ACETAMINOPHEN (NORCO) 5-325 MG TAB PER TABLET    Take 1-2 Tabs by mouth every four hours as needed.                 Electronically signed by: Jarett Davila, 2/28/2017 6:18 PM

## 2017-03-01 NOTE — ED NOTES
Pt provided with work note and rx for pain medication. Pt is to have f/u with ortho outpatient for further medical clearance.

## 2018-02-16 ENCOUNTER — APPOINTMENT (OUTPATIENT)
Dept: RADIOLOGY | Facility: MEDICAL CENTER | Age: 23
End: 2018-02-16
Attending: EMERGENCY MEDICINE
Payer: COMMERCIAL

## 2018-02-16 ENCOUNTER — HOSPITAL ENCOUNTER (EMERGENCY)
Facility: MEDICAL CENTER | Age: 23
End: 2018-02-16
Attending: EMERGENCY MEDICINE
Payer: COMMERCIAL

## 2018-02-16 VITALS
WEIGHT: 148.59 LBS | SYSTOLIC BLOOD PRESSURE: 112 MMHG | TEMPERATURE: 100.1 F | BODY MASS INDEX: 22.01 KG/M2 | HEIGHT: 69 IN | RESPIRATION RATE: 16 BRPM | DIASTOLIC BLOOD PRESSURE: 67 MMHG | HEART RATE: 64 BPM | OXYGEN SATURATION: 98 %

## 2018-02-16 DIAGNOSIS — S43.005A DISLOCATION OF LEFT SHOULDER JOINT, INITIAL ENCOUNTER: ICD-10-CM

## 2018-02-16 PROCEDURE — 73030 X-RAY EXAM OF SHOULDER: CPT | Mod: LT

## 2018-02-16 PROCEDURE — 99285 EMERGENCY DEPT VISIT HI MDM: CPT

## 2018-02-16 PROCEDURE — 96374 THER/PROPH/DIAG INJ IV PUSH: CPT

## 2018-02-16 PROCEDURE — 23650 CLTX SHO DSLC W/MNPJ WO ANES: CPT

## 2018-02-16 PROCEDURE — 700111 HCHG RX REV CODE 636 W/ 250 OVERRIDE (IP): Performed by: EMERGENCY MEDICINE

## 2018-02-16 RX ADMIN — FENTANYL CITRATE 50 MCG: 50 INJECTION, SOLUTION INTRAMUSCULAR; INTRAVENOUS at 10:52

## 2018-02-16 ASSESSMENT — PAIN SCALES - GENERAL
PAINLEVEL_OUTOF10: 2
PAINLEVEL_OUTOF10: 10

## 2018-02-16 NOTE — DISCHARGE INSTRUCTIONS
Shoulder Dislocation  Your shoulder is made up of three bones: the collar bone (clavicle); the shoulder blade (scapula), which includes the socket (glenoid cavity); and the upper arm bone (humerus). Your shoulder joint is the place where these bones meet. Strong, fibrous tissues hold these bones together (ligaments). Muscles and strong, fibrous tissues that connect the muscles to these bones (tendons) allow your arm to move through this joint. The range of motion of your shoulder joint is more extensive than most of your other joints, and the glenoid cavity is very shallow. That is the reason that your shoulder joint is one of the most unstable joints in your body. It is far more prone to dislocation than your other joints. Shoulder dislocation is when your humerus is forced out of your shoulder joint.  CAUSES  Shoulder dislocation is caused by a forceful impact on your shoulder. This impact usually is from an injury, such as a sports injury or a fall.  SYMPTOMS  Symptoms of shoulder dislocation include:  · Deformity of your shoulder.  · Intense pain.  · Inability to move your shoulder joint.  · Numbness, weakness, or tingling around your shoulder joint (your neck or down your arm).  · Bruising or swelling around your shoulder.  DIAGNOSIS  In order to diagnose a dislocated shoulder, your caregiver will perform a physical exam. Your caregiver also may have an X-ray exam done to see if you have any broken bones. Magnetic resonance imaging (MRI) is a procedure that sometimes is done to help your caregiver see any damage to the soft tissues around your shoulder, particularly your rotator cuff tendons. Additionally, your caregiver also may have electromyography done to measure the electrical discharges produced in your muscles if you have signs or symptoms of nerve damage.  TREATMENT  A shoulder dislocation is treated by placing the humerus back in the joint (reduction). Your caregiver does this either manually (closed  reduction), by moving your humerus back into the joint through manipulation, or through surgery (open reduction). When your humerus is back in place, severe pain should improve almost immediately.  You also may need to have surgery if you have a weak shoulder joint or ligaments, and you have recurring shoulder dislocations, despite rehabilitation. In rare cases, surgery is necessary if your nerves or blood vessels are damaged during the dislocation.  After your reduction, your arm will be placed in a shoulder immobilizer or sling to keep it from moving. Your caregiver will have you wear your shoulder immobilizer or sling for 3 days to 3 weeks, depending on how serious your dislocation is. When your shoulder immobilizer or sling is removed, your caregiver may prescribe physical therapy to help improve the range of motion in your shoulder joint.  HOME CARE INSTRUCTIONS   The following measures can help to reduce pain and speed up the healing process:  · Rest your injured joint. Do not move it. Avoid activities similar to the one that caused your injury.  · Apply ice to your injured joint for the first day or two after your reduction or as directed by your caregiver. Applying ice helps to reduce inflammation and pain.  ¨ Put ice in a plastic bag.  ¨ Place a towel between your skin and the bag.  ¨ Leave the ice on for 15-20 minutes at a time, every 2 hours while you are awake.  · Exercise your hand by squeezing a soft ball. This helps to eliminate stiffness and swelling in your hand and wrist.  · Take over-the-counter or prescription medicine for pain or discomfort as told by your caregiver.  SEEK IMMEDIATE MEDICAL CARE IF:   · Your shoulder immobilizer or sling becomes damaged.  · Your pain becomes worse rather than better.  · You lose feeling in your arm or hand, or they become white and cold.  MAKE SURE YOU:   · Understand these instructions.  · Will watch your condition.  · Will get help right away if you are not  doing well or get worse.     This information is not intended to replace advice given to you by your health care provider. Make sure you discuss any questions you have with your health care provider.     Document Released: 09/12/2002 Document Revised: 01/08/2016 Document Reviewed: 04/11/2016  [employer] Interactive Patient Education ©2016 [employer] Inc.

## 2018-02-16 NOTE — ED NOTES
Pt discharged to home. Pt was given follow up instructions. Pt verbalized understanding of all instructions for discharge and is ambulatory out of ED with steady gait, family driving home.

## 2018-02-16 NOTE — ED PROVIDER NOTES
"ED Provider Note      CHIEF COMPLAINT   Chief Complaint   Patient presents with   • Shoulder Pain       HPI   Emir Aguilar is a 22 y.o. male who presents with left shoulder pain. She has dislocated his shoulder 5 times. Today he woke up and felt pain in his left shoulder. Throbbing nonradiating constant worse with movement. He noticed deformity and comes to the ER.      REVIEW OF SYSTEMS   Pertinent negative: No numbness, tingling, weakness    PAST MEDICAL HISTORY   Shoulder dislocation    SOCIAL HISTORY  Social History   Substance Use Topics   • Smoking status: Current Every Day Smoker     Types: Cigarettes   • Smokeless tobacco: Not on file   • Alcohol use Yes      Comment: occ       ALLERGIES   See chart    PHYSICAL EXAM  VITAL SIGNS: /67   Pulse 64   Temp 37.8 °C (100.1 °F)   Resp 16   Ht 1.753 m (5' 9\")   Wt 67.4 kg (148 lb 9.4 oz)   SpO2 98%   BMI 21.94 kg/m²   Head: Atraumatic  Eyes: Eyes normal inspection  Neck: has full range of motion, normal inspection.  Constitutional: No acute distress   Cardiovascular: Normal heart rate. Pulses strong radial  Thorax & Lungs: No respiratory distress  Skin: Intact  Musculoskeletal: Obvious deformity with anterior left shoulder dislocation. Normal range of motion of the other extremities Compartments soft.  Neurologic:  Normal sensory and motor    RADIOLOGY/PROCEDURES  DX-SHOULDER 2+ LEFT   Final Result      No radiographic evidence of acute traumatic injury or dislocation.         Imaging is interpreted by radiologist reviewed by me      Joint Reduction Procedure Note    Indication: Joint dislocation    Consent: The patient was counseled regarding the procedure, it's indications, risks, potential complications and alternatives and any questions were answered. Consent was obtained.    Procedure: The pre-reduction exam showed distal perfusion & neurologic function to be normal. The patient was placed in the prone position. With the left arm hanging off of " the bed. Weight was applied to the left arm. The scapula was manipulated. The shoulder easily reduced. Patient felt improved.    The patient tolerated the procedure well.    Complications: None        COURSE & MEDICAL DECISION MAKING  Analgesia for possible fracture-fentanyl    Patient presented with obvious recurrent left shoulder dislocation.    I'm familiar with the patient. I've seen him in the past and he responded well to scapular manipulation.    Patient was given fentanyl IV. His shoulder was reduced as above. Postprocedure x-ray shows appropriate position.    He has had recurrent dislocations. He has never followed up with orthopedics. I stressed that he should follow up with orthopedics or this will continue to occur. He was referred to Dr. Salinas who is on-call      FINAL IMPRESSION  1. Left shoulder dislocation  2. Closed reduction left shoulder dislocation by me      This dictation was created using voice recognition software. The accuracy of the dictation is limited to the abilities of the software. I expect there may be some errors of grammar and possibly content. The nursing notes were reviewed and certain aspects of this information were incorporated into this note.      Electronically signed by: Molina Murillo, 2/16/2018 11:52 AM

## 2018-09-07 ENCOUNTER — HOSPITAL ENCOUNTER (EMERGENCY)
Facility: MEDICAL CENTER | Age: 23
End: 2018-09-07
Attending: EMERGENCY MEDICINE
Payer: COMMERCIAL

## 2018-09-07 ENCOUNTER — APPOINTMENT (OUTPATIENT)
Dept: RADIOLOGY | Facility: MEDICAL CENTER | Age: 23
End: 2018-09-07
Attending: EMERGENCY MEDICINE
Payer: COMMERCIAL

## 2018-09-07 VITALS
OXYGEN SATURATION: 98 % | HEART RATE: 88 BPM | SYSTOLIC BLOOD PRESSURE: 128 MMHG | WEIGHT: 154.32 LBS | TEMPERATURE: 98.6 F | HEIGHT: 69 IN | DIASTOLIC BLOOD PRESSURE: 79 MMHG | BODY MASS INDEX: 22.86 KG/M2 | RESPIRATION RATE: 18 BRPM

## 2018-09-07 DIAGNOSIS — S43.005A DISLOCATION OF LEFT SHOULDER JOINT, INITIAL ENCOUNTER: ICD-10-CM

## 2018-09-07 PROCEDURE — 700101 HCHG RX REV CODE 250

## 2018-09-07 PROCEDURE — 96375 TX/PRO/DX INJ NEW DRUG ADDON: CPT

## 2018-09-07 PROCEDURE — 304561 HCHG STAT O2

## 2018-09-07 PROCEDURE — 700111 HCHG RX REV CODE 636 W/ 250 OVERRIDE (IP): Performed by: EMERGENCY MEDICINE

## 2018-09-07 PROCEDURE — 73030 X-RAY EXAM OF SHOULDER: CPT | Mod: LT

## 2018-09-07 PROCEDURE — 23650 CLTX SHO DSLC W/MNPJ WO ANES: CPT

## 2018-09-07 PROCEDURE — 96374 THER/PROPH/DIAG INJ IV PUSH: CPT

## 2018-09-07 PROCEDURE — 700105 HCHG RX REV CODE 258: Performed by: EMERGENCY MEDICINE

## 2018-09-07 PROCEDURE — 73020 X-RAY EXAM OF SHOULDER: CPT | Mod: LT

## 2018-09-07 PROCEDURE — 99285 EMERGENCY DEPT VISIT HI MDM: CPT

## 2018-09-07 RX ORDER — MIDAZOLAM HYDROCHLORIDE 1 MG/ML
1 INJECTION INTRAMUSCULAR; INTRAVENOUS ONCE
Status: COMPLETED | OUTPATIENT
Start: 2018-09-07 | End: 2018-09-07

## 2018-09-07 RX ORDER — SODIUM CHLORIDE 9 MG/ML
1000 INJECTION, SOLUTION INTRAVENOUS ONCE
Status: COMPLETED | OUTPATIENT
Start: 2018-09-07 | End: 2018-09-07

## 2018-09-07 RX ORDER — ONDANSETRON 2 MG/ML
4 INJECTION INTRAMUSCULAR; INTRAVENOUS ONCE
Status: COMPLETED | OUTPATIENT
Start: 2018-09-07 | End: 2018-09-07

## 2018-09-07 RX ORDER — KETAMINE HYDROCHLORIDE 50 MG/ML
70 INJECTION, SOLUTION INTRAMUSCULAR; INTRAVENOUS ONCE
Status: COMPLETED | OUTPATIENT
Start: 2018-09-07 | End: 2018-09-07

## 2018-09-07 RX ORDER — KETAMINE HYDROCHLORIDE 50 MG/ML
INJECTION, SOLUTION INTRAMUSCULAR; INTRAVENOUS
Status: COMPLETED
Start: 2018-09-07 | End: 2018-09-07

## 2018-09-07 RX ORDER — HYDROMORPHONE HYDROCHLORIDE 2 MG/ML
0.5 INJECTION, SOLUTION INTRAMUSCULAR; INTRAVENOUS; SUBCUTANEOUS ONCE
Status: COMPLETED | OUTPATIENT
Start: 2018-09-07 | End: 2018-09-07

## 2018-09-07 RX ADMIN — HYDROMORPHONE HYDROCHLORIDE 0.5 MG: 2 INJECTION INTRAMUSCULAR; INTRAVENOUS; SUBCUTANEOUS at 19:55

## 2018-09-07 RX ADMIN — SODIUM CHLORIDE 1000 ML: 9 INJECTION, SOLUTION INTRAVENOUS at 22:00

## 2018-09-07 RX ADMIN — KETAMINE HYDROCHLORIDE 70 MG: 50 INJECTION, SOLUTION INTRAMUSCULAR; INTRAVENOUS at 21:15

## 2018-09-07 RX ADMIN — MIDAZOLAM HYDROCHLORIDE 1 MG: 1 INJECTION, SOLUTION INTRAMUSCULAR; INTRAVENOUS at 21:12

## 2018-09-07 RX ADMIN — FENTANYL CITRATE 50 MCG: 50 INJECTION INTRAMUSCULAR; INTRAVENOUS at 21:11

## 2018-09-07 RX ADMIN — ONDANSETRON 4 MG: 2 INJECTION INTRAMUSCULAR; INTRAVENOUS at 19:55

## 2018-09-07 RX ADMIN — FENTANYL CITRATE 50 MCG: 50 INJECTION, SOLUTION INTRAMUSCULAR; INTRAVENOUS at 21:14

## 2018-09-07 RX ADMIN — KETAMINE HYDROCHLORIDE 70 MG: 50 INJECTION, SOLUTION, CONCENTRATE INTRAMUSCULAR; INTRAVENOUS at 21:15

## 2018-09-07 ASSESSMENT — LIFESTYLE VARIABLES: DO YOU DRINK ALCOHOL: NO

## 2018-09-07 ASSESSMENT — PAIN SCALES - GENERAL: PAINLEVEL_OUTOF10: 6

## 2018-09-07 NOTE — LETTER
"  FORM C-4:  EMPLOYEE’S CLAIM FOR COMPENSATION/ REPORT OF INITIAL TREATMENT  EMPLOYEE’S CLAIM - PROVIDE ALL INFORMATION REQUESTED   First Name  Emir Last Name  Jeff Birthdate             Age  1995 23 y.o. Sex  male Claim Number   Home Employee Address  [address]   [facility]                                     Zip  [address] Height  1.753 m (5' 9\") Weight  70 kg (154 lb 5.2 oz) [facility]     Mailing Employee Address                           [address]   [facility]               Zip  [address] Telephone  [phone] (home)  Primary Language Spoken  ENGLISH   Insurer  [employer] Third [employer]   [employer] CLAIM[employer] Employee's Occupation (Job Title) When Injury or Occupational Disease Occurred     Employer's Name  [employer] Telephone  [phone]    Employer Address  [address] [facility] [address]] Zip  [address]   Date of Injury  9/7/2018       Hour of Injury  6:15 PM Date Employer Notified  9/7/2018 Last Day of Work after Injury or Occupational Disease  9/7/2018 Supervisor to Whom Injury Reported  Jennifer   Address or Location of Accident (if applicable)  [place]   What were you doing at the time of accident? (if applicable)  putting up my tape gun    How did this injury or occupational disease occur? Be specific and answer in detail. Use additional sheet if necessary)  I was putting back my tape gun on the shelf and sneezed. My shoulder popped out   If you believe that you have an occupational disease, when did you first have knowledge of the disability and it relationship to your employment?  N/A Witnesses to the Accident  none     Nature of Injury or Occupational Disease  Dislocation  Part(s) of Body Injured or Affected  Shoulder (L), N/A, N/A    I certify that the above is true and correct to the best of my knowledge and that I have provided this information in order to obtain the " benefits of [place]  I hereby authorize any physician, chiropractor, surgeon, practitioner, or other person, any hospital, including [facility] or [facility], any medical service organization, any insurance company, or other institution or organization to release to each other, any medical or other information, including benefits paid or payable, pertinent to this injury or disease, except information relative to diagnosis, treatment and/or counseling for AIDS, psychological conditions, alcohol or controlled substances, for which I must give specific authorization.  A Photostat of this authorization shall be as valid as the original.   Date  9/7/18 Place  [facility] Employee’s Signature   THIS REPORT MUST BE COMPLETED AND MAILED WITHIN 3 WORKING DAYS OF TREATMENT   Place  [facility], EMERGENCY DEPT  Name of Facility   [facility]   Date  9/7/2018 Diagnosis  (S43.005A) Dislocation of left shoulder joint, initial encounter Is there evidence the injured employee was under the influence of alcohol and/or another controlled substance at the time of accident?   Hour  11:25 PM Description of Injury or Disease  Dislocation of left shoulder joint, initial encounter No   Treatment  Shoulder reduction  Have you advised the patient to remain off work five days or more?         No   X-Ray Findings  Positive  Comments:Left shoulder anterior dislocation   If Yes   From Date    To Date      From information given by the employee, together with medical evidence, can you directly connect this injury or occupational disease as job incurred?  Yes If No, is the employee capable of: Full Duty  No Modified Duty  Yes   Is additional medical care by a physician indicated?  Yes If Modified Duty, Specify any Limitations / Restrictions  Light duty     Do you  "know of any previous injury or disease contributing to this condition or occupational disease?  No   Date  9/7/2018 Print Doctor’s Name  Whit Lion certify the employer’s copy of this form was mailed on:   Address  [address] [facility] [address] [phone] Insurer’s Use Only   [facility]  [address]    Provider’s Tax ID Number  [id number] Telephone  Dept: [phone]    Doctor’s Signature  e-WHIT Roberts M.D. Degree   MD    Original - TREATING PHYSICIAN OR CHIROPRACTOR   Pg 2-Insurer/TPA   Pg 3-Employer   Pg 4-Employee                                                                                                  Form C-4 (rev01/03)     BRIEF DESCRIPTION OF RIGHTS AND BENEFITS  (Pursuant to [place] 616C.050)    Notice of Injury or Occupational Disease (Incident Report Form C-1): If an injury or occupational disease (OD) arises out of and in the course of employment, you must provide written notice to your employer as soon as practicable, but no later than 7 days after the accident or OD. Your employer shall maintain a sufficient supply of the required forms.  Claim for Compensation (Form C-4): If medical treatment is sought, the form C-4 is available at the place of initial treatment. A completed \"Claim for Compensation\" (Form C-4) must be filed within 90 days after an accident or OD. The treating physician or chiropractor must, within 3 working days after treatment, complete and mail to the employer, the employer's insurer and third-party , the Claim for Compensation.  Medical Treatment: If you require medical treatment for your on-the-job injury or OD, you may be required to select a physician or chiropractor from a list provided by your workers’ compensation insurer, if it has contracted with an Organization for Managed Care (MCO) or Preferred Provider Organization (PPO) or providers of health care. If your employer has not entered into a contract with an MCO or " PPO, you may select a physician or chiropractor from the Panel of Physicians and Chiropractors. Any medical costs related to your industrial injury or OD will be paid by your insurer.  Temporary Total Disability (TTD): If your doctor has certified that you are unable to work for a period of at least 5 consecutive days, or 5 cumulative days in a 20-day period, or places restrictions on you that your employer does not accommodate, you may be entitled to TTD compensation.  Temporary Partial Disability (TPD): If the wage you receive upon reemployment is less than the compensation for TTD to which you are entitled, the insurer may be required to pay you TPD compensation to make up the difference. TPD can only be paid for a maximum of 24 months.  Permanent Partial Disability (PPD): When your medical condition is stable and there is an indication of a PPD as a result of your injury or OD, within 30 days, your insurer must arrange for an evaluation by a rating physician or chiropractor to determine the degree of your PPD. The amount of your PPD award depends on the date of injury, the results of the PPD evaluation and your age and wage.  Permanent Total Disability (PTD): If you are medically certified by a treating physician or chiropractor as permanently and totally disabled and have been granted a PTD status by your insurer, you are entitled to receive monthly benefits not to exceed 66 2/3% of your average monthly wage. The amount of your PTD payments is subject to reduction if you previously received a PPD award.  Vocational Rehabilitation Services: You may be eligible for vocational rehabilitation services if you are unable to return to the job due to a permanent physical impairment or permanent restrictions as a result of your injury or occupational disease.  Transportation and Per Fabian Reimbursement: You may be eligible for travel expenses and per fabian associated with medical treatment.  Reopening: You may be able to  reopen your claim if your condition worsens after claim closure.  Appeal Process: If you disagree with a written determination issued by the insurer or the insurer does not respond to your request, you may appeal to the Department of Administration, , by following the instructions contained in your determination letter. You must appeal the determination within 70 days from the date of the determination letter at [address], [address], [place], [place] [address], or [address], [place], [place] [address]. If you disagree with the  decision, you may appeal to the Department of Administration, . You must file your appeal within 30 days from the date of the  decision letter at [address], [address], [place], [place] [address], or [address] [facility] [address], [place], [place] [address]. If you disagree with a decision of an , you may file a petition for judicial review with the District Court. You must do so within 30 days of the Appeal Officer’s decision. You may be represented by an  at your own expense or you may contact the [facility] for possible representation.  [place]  for Injured Workers (NAIW): If you disagree with a  decision, you may request that NAIW represent you without charge at an  Hearing. For information regarding denial of benefits, you may contact the [facility] at: [address], [address], [place] [address], [phone], or [address], [place] [address], [phone]  To File a Complaint with the Division: If you wish to file a complaint with the  of the Division of Industrial Relations (DIR), please contact the Workers’ Compensation Section, [address], [address] [facility] [address], [place], [place] [address], telephone [phone], or [address], [place], [place] [address],  telephone [phone].  For assistance with Workers’ Compensation Issues: you may contact the Office of the Governor Consumer Health Assistance, [address] [place] [address], [address] [facility] [address] [facility] [address], [place] [address], Toll Free 1-940.442.1056, Web site: http://[employer].[facility].nv., E-mail rudolph@[facility].nv.                                                                                                                                                                               __________________________________________________________________                                    _________________            Employee Name / Signature                                                                                                                            Date                                       D-2 (rev. 10/07)

## 2018-09-08 NOTE — DISCHARGE INSTRUCTIONS
You were seen in the ER for a shoulder dislocation. The dislocation was reduced and we have placed you in a sling. You are safe for discharge. It is important that you follow up with an orthopedic surgeon and I have spoken with Dr. Haney, our on-call orthopedist who will see you in his office. Please call on Monday to make an appointment. Keep the sling in place until you follow up in his office. Return to the ER with new or worsening symptoms.    Shoulder Dislocation  A shoulder dislocation happens when the upper arm bone (humerus) moves out of the shoulder joint. The shoulder joint is the part of the shoulder where the humerus, shoulder blade (scapula), and collarbone (clavicle) meet.  What are the causes?  This condition is often caused by:  · A fall.  · A hit to the shoulder.  · A forceful movement of the shoulder.  What increases the risk?  This condition is more likely to develop in people who play sports.  What are the signs or symptoms?  Symptoms of this condition include:  · Deformity of the shoulder.  · Intense pain.  · Inability to move the shoulder.  · Numbness, weakness, or tingling in your neck or down your arm.  · Bruising or swelling around your shoulder.  How is this diagnosed?  This condition is diagnosed with a physical exam. After the exam, tests may be done to check for related problems. Tests that may be done include:  · X-ray. This may be done to check for broken bones.  · MRI. This may be done to check for damage to the tissues around the shoulder.  · Electromyogram. This may be done to check for nerve damage.  How is this treated?  This condition is treated with a procedure to place the humerus back in the joint. This procedure is called a reduction. There are two types of reduction:  · Closed reduction. In this procedure, the humerus is placed back in the joint without surgery. The health care provider uses his or her hands to guide the bone back into place.  · Open reduction. In this  procedure, the humerus is placed back in the joint with surgery. An open reduction may be recommended if:  ¨ You have a weak shoulder joint or weak ligaments.  ¨ You have had more than one shoulder dislocation.  ¨ The nerves or blood vessels around your shoulder have been damaged.  After the humerus is placed back into the joint, your arm will be placed in a splint or sling to prevent it from moving. You will need to wear the splint or sling until your shoulder heals. When the splint or sling is removed, you may have physical therapy to help improve the range of motion in your shoulder joint.  Follow these instructions at home:  If you have a splint or sling:  · Wear it as told by your health care provider. Remove it only as told by your health care provider.  · Loosen it if your fingers become numb and tingle, or if they turn cold and blue.  · Keep it clean and dry.  Bathing  · Do not take baths, swim, or use a hot tub until your health care provider approves. Ask your health care provider if you can take showers. You may only be allowed to take sponge baths for bathing.  · If your health care provider approves bathing and showering, cover your splint or sling with a watertight plastic bag to protect it from water. Do not let the splint or sling get wet.  Managing pain, stiffness, and swelling  · If directed, apply ice to the injured area.  ¨ Put ice in a plastic bag.  ¨ Place a towel between your skin and the bag.  ¨ Leave the ice on for 20 minutes, 2-3 times per day.  · Move your fingers often to avoid stiffness and to decrease swelling.  · Raise (elevate) the injured area above the level of your heart while you are sitting or lying down.  Driving  · Do not drive while wearing a splint or sling on a hand that you use for driving.  · Do not drive or operate heavy machinery while taking pain medicine.  Activity  · Return to your normal activities as told by your health care provider. Ask your health care provider  what activities are safe for you.  · Perform range-of-motion exercises only as told by your health care provider.  · Exercise your hand by squeezing a soft ball. This helps to decrease stiffness and swelling in your hand and wrist.  General instructions  · Take over-the-counter and prescription medicines only as told by your health care provider.  · Do not use any tobacco products, including cigarettes, chewing tobacco, or e-cigarettes. Tobacco can delay bone and tissue healing. If you need help quitting, ask your health care provider.  · Keep all follow-up visits as told by your health care provider. This is important.  Contact a health care provider if:  · Your splint or sling gets damaged.  Get help right away if:  · Your pain gets worse rather than better.  · You lose feeling in your arm or hand.  · Your arm or hand becomes white and cold.  This information is not intended to replace advice given to you by your health care provider. Make sure you discuss any questions you have with your health care provider.  Document Released: 09/12/2002 Document Revised: 08/13/2017 Document Reviewed: 04/11/2016  [employer] Interactive Patient Education © 2017 [employer]

## 2018-09-08 NOTE — ED TRIAGE NOTES
Pt bib remsa from work, pt attempting to place a tool on the rack and sneezed, felt a pop in left shoulder that feels similar to previous dislocations. Pt states he has dislocated left shoulder approx 6x in the past. EMS medicated with 175mcg fentanyl and placed IV. Pt c/o 6/10 pain. Vss. Updated on ed process. No further needs from pt at this time.

## 2018-09-08 NOTE — ED PROVIDER NOTES
"ED Provider Note    Scribed for Collin Lion M.D. by Justin Mohr. 9/7/2018, 7:48 PM.    Primary care provider: Pcp Pt States None  Means of arrival: Shyla  History obtained from: Patient  History limited by: None    CHIEF COMPLAINT  Left Shoulder Pain    HPI  Emir Aguilar is a 23 y.o. left-hand-dominant male with a history of multiple shoulder dislocations who presents to the Emergency Department complaining of left shoulder pain onset earlier today while he was at work. He was on a rack reaching for a tape gun six feet above him when he sneezed and hit his left hand on a shelf. He felt a pop in his left shoulder and experienced immediate pain. He cannot move his left shoulder secondary to pain. No further injuries.     REVIEW OF SYSTEMS  Pertinent positives include left shoulder pain. Pertinent negatives include no loss of consicousness. As above, all other systems reviewed and are negative.   See HPI for further details.     PAST MEDICAL HISTORY  None noted.     SURGICAL HISTORY  patient denies any surgical history    SOCIAL HISTORY  Social History   Substance Use Topics   • Smoking status: Current Every Day Smoker     Types: Cigarettes   • Smokeless tobacco: Never Used   • Alcohol use Yes      Comment: occ      History   Drug Use   • Types: Oral     Comment: marijuana, mushrooms       FAMILY HISTORY  None noted.     CURRENT MEDICATIONS  Home Medications     Reviewed by Lisa Moreau R.N. (Registered Nurse) on 09/07/18 at 1942  Med List Status: Complete   Medication Last Dose Status        Patient Chester Taking any Medications                       ALLERGIES  No Known Allergies    PHYSICAL EXAM  VITAL SIGNS: /68   Temp 37 °C (98.6 °F)   Resp 17   Ht 1.753 m (5' 9\")   Wt 70 kg (154 lb 5.2 oz)   BMI 22.79 kg/m²   Vitals reviewed.  Constitutional: Alert and mild distress.  HENT: No signs of trauma, Bilateral external ears normal, Nose normal.   Eyes: Pupils are equal and reactive, Conjunctiva " normal, Non-icteric.   Neck: Normal range of motion, No tenderness, Supple, No stridor.   Lymphatic: No lymphadenopathy noted.   Cardiovascular: Regular rate and rhythm, no murmurs.   Thorax & Lungs: Normal breath sounds, No respiratory distress, No wheezing, No chest tenderness.   Abdomen: Bowel sounds normal, Soft, No tenderness, No peritoneal signs, No masses, No pulsatile masses.   Skin: Warm, Dry, No erythema, No rash.   Extremities: Intact distal pulses-2+ radial pulse in the left wrist, No edema, No tenderness, No cyanosis  Musculoskeletal: Obvious deformity of the left shoulder  Neurologic: Alert, Normal motor function, Decreased sensation along the thenar aspect left index finger, decreased sensation on the lateral aspect of the left thumb, decreased sensation over the left deltoid.   Psychiatric: Affect normal, Judgment normal, Mood normal.     DIAGNOSTIC STUDIES / PROCEDURES  REDUCTION PROCEDURE NOTE:  Patient identification was confirmed, consent was obtained verbally.  This procedure was performed at [facility] by Dr. Lion.  Left Shoulder   Anesthetic used (type and amt): Ketamine 70 mg (.5mg/kg), Versed 1 mg, fentanyl 100 µg   # of attempts: 1  Type of splint: Sling   Pt anesthetized, dislocation reduced successfully. Patient tolerated procedure well without complications. Patient placed in a shoulder sling. Post-procedure exam indicates patient is n/v intact distal to the injury site. Post-procedure films show excellent alignment. Patient returned to baseline prior to disposition. Instructions for care discussed verbally and patient provided with additional written instructions for homecare and f/u.    RADIOLOGY  DX-SHOULDER 2+ LEFT   Final Result         1.  Anterior left shoulder dislocation.           The radiologist's interpretation of all radiological studies have been reviewed by me.    COURSE & MEDICAL DECISION MAKING  Nursing notes, VS, PMSFHx reviewed in chart.  Differential diagnoses  include but not limited to: Dislocation vs. Fracture vs. Contusion vs. AC Separation     7:48 PM Patient seen and examined at bedside. Patient arrives afebrile with normal vital signs. Patient appears well hydrated and non-toxic. The physical exam is remarkable for an obvious deformity in the left shoulder consistent with dislocation. Ordered for DX Shoulder left to evaluate. Patient was treated with IV fluids during conscious sedation for hemodynamic control.     Left shoulder dislocation was reduced as above. X-ray revealed satisfactory reduction. Patient neurovascularly intact following the procedure. Patient tolerated the procedure well. There were no complications. The patient's arm was placed in a sling.    I discussed the patient with the on-call orthopedist, Dr. Haney, who agrees to see the patient in his office as he will likely require surgical intervention.    10:39 PM - Patient was reevaluated at bedside. His left shoulder pain has resolved and I have advised him to see an orthopedist. He says that he has seen an orthopedist in the past but agrees to setup another appointment. He is alert and oriented, has tolerated by mouth, has ambulated throughout the emergency department without difficulty. He is safe for discharge with close outpatient follow-up. He is to keep the sling in place until he follows up with orthopedics. Tylenol and ibuprofen as directed on the bottle for pain control. Return to the ER with any new or worsening symptoms.    DISPOSITION:  Patient will be discharged home in stable condition.    FOLLOW UP:  Shemar Haney M.D.  [address] [place] [address]  [phone]    Schedule an appointment as soon as possible for a visit in 2 days  for further workup      OUTPATIENT MEDICATIONS:  New Prescriptions    No medications on file     FINAL IMPRESSION  1. Dislocation of left shoulder joint, initial encounter          I, Justin Mohr (Scribe), am scribing for, and in the presence  ofCollin M.D..    Electronically signed by: Justin Mohr (Scribe), 9/7/2018    I, Collin Lion M.D. personally performed the services described in this documentation, as scribed by Justin Mohr in my presence, and it is both accurate and complete. C.     The note accurately reflects work and decisions made by me.  Collin Lion  9/8/2018  2:10 AM

## 2018-09-08 NOTE — ED NOTES
Pt ambulated around blue pod with steady gait, denies dizziness at this time, pt now alert and oriented x4

## 2018-09-08 NOTE — ED NOTES
"Pt sleeping, periodically waking to ask if \"have you fixed my arm?\". Vss. Pt states he has to call a cab through his company upon discharge.   "

## 2018-09-08 NOTE — ED NOTES
Conscious sedation for left shoulder reducgtion with Dr. Lion at 2111, see MAR for time of meds given. Pt tolerated and placed in sling, vss. RT at bedside for procedure

## 2018-09-08 NOTE — ED NOTES
Patient discharged home to self care, provided with paper copy of discharge instructions. Discussed discharge instructions and follow up appointments, patient verbalizes understanding. IV removed,  Bandage placed. Vital signs stable, escorted out to ED lobby.

## 2018-09-08 NOTE — RESPIRATORY CARE
Conscious Sedation Respiratory Update    Pt titrated to 2 LPM, SPO2 100%. No complications, released post procedure.

## 2018-09-10 ENCOUNTER — PATIENT OUTREACH (OUTPATIENT)
Dept: HEALTH INFORMATION MANAGEMENT | Facility: OTHER | Age: 23
End: 2018-09-10

## 2018-09-10 NOTE — PROGRESS NOTES
9/10/18 at 3:32 PM--Received phone call from pt s/p ER discharge 9/7/18.  Pt states that he returned to work today and his employer stated that paperwork from [facility] [employer] was needed.  Pt states that he was not instructed to f/u with [facility].  Transferred pt to [facility] ER so that he may inquire about this issue.  Pt states he has no further questions or concerns at this time.

## 2018-09-11 ENCOUNTER — OCCUPATIONAL MEDICINE (OUTPATIENT)
Dept: URGENT CARE | Facility: PHYSICIAN GROUP | Age: 23
End: 2018-09-11
Payer: COMMERCIAL

## 2018-09-11 VITALS
BODY MASS INDEX: 22.81 KG/M2 | TEMPERATURE: 98.7 F | HEART RATE: 67 BPM | OXYGEN SATURATION: 97 % | WEIGHT: 154 LBS | HEIGHT: 69 IN | SYSTOLIC BLOOD PRESSURE: 100 MMHG | DIASTOLIC BLOOD PRESSURE: 68 MMHG | RESPIRATION RATE: 18 BRPM

## 2018-09-11 DIAGNOSIS — S43.015D ANTERIOR DISLOCATION OF LEFT SHOULDER, SUBSEQUENT ENCOUNTER: ICD-10-CM

## 2018-09-11 PROCEDURE — 99214 OFFICE O/P EST MOD 30 MIN: CPT | Mod: 29 | Performed by: PHYSICIAN ASSISTANT

## 2018-09-11 ASSESSMENT — PAIN SCALES - GENERAL: PAINLEVEL: 6=MODERATE PAIN

## 2018-09-11 NOTE — PATIENT INSTRUCTIONS
Ibuprofen or Naproxen     Traumatic Shoulder Instability Rehab  Ask your health care provider which exercises are safe for you. Do exercises exactly as told by your health care provider and adjust them as directed. It is normal to feel mild stretching, pulling, tightness, or discomfort as you do these exercises, but you should stop right away if you feel sudden pain or your pain gets worse. Do not begin these exercises until told by your health care provider.  Stretching and range of motion exercise  This exercise warms up your muscles and joints and improves the movement and flexibility of your arm and shoulder. This exercise also helps to relieve pain, numbness, and tingling.  Exercise A: Pendulum  1. Stand near a wall or a surface that you can hold onto for balance.  2. Bend at the waist and let your left / right arm hang straight down. Use your other arm to support you.  3. Relax your arm and shoulder muscles, and move your hips and your trunk so your left / right arm swings freely. Your arm should swing because of the motion of your body, not because you are using your arm or shoulder muscles.  4. Keep moving so your arm swings in the following directions, as told by your health care provider:  ¨ Side to side.  ¨ Forward and backward.  ¨ In clockwise and counterclockwise circles.  5. Slowly return to the starting position.  Repeat __________ times. Complete this exercise __________ times a day.  Strengthening exercises  These exercises build strength and endurance in your arm and shoulder. Endurance is the ability to use your muscles for a long time, even after they get tired.  Exercise B: Scapular depression and retraction, isometric  1. Sit on a stable chair. Support your arms in front of you with pillows, armrests, or a tabletop. Keep your elbows near the sides of your body.  2. Gently move your shoulder blades down and back toward your spine. Relax the muscles on the tops of your shoulders and in the back  of your neck.  3. Hold for __________ seconds.  4. Slowly release the tension and relax your muscles completely.  Repeat __________ times. Complete this exercise __________ times a day. After you have practiced this exercise, try doing it without the arm support. Then, try doing it while standing instead of sitting.  Exercise C: External rotation, isometric  1. Stand or sit in a doorway, facing the door frame.  2. Bend your left / right elbow and place the back of your wrist against the door frame. Only your wrist should be touching the frame. Keep your upper arm at your side.  3. Squeeze your shoulder blade down and back. Gently press your wrist against the door frame, as if you are trying to push your arm away from your abdomen.  ¨ Avoid shrugging your shoulder while you do this. Keep your shoulder blade tucked down toward the middle of your back.  4. Hold for __________ seconds.  5. Slowly release the tension, and relax your muscles completely repeating the exercise.  Repeat __________ times. Complete this exercise __________ times a day.  Exercise D: Scapular protraction, standing  1. Stand so you are facing a wall, about one arm-length away from the wall.  2. Place your hands on the wall and straighten your elbows.  3. Keep your hands on the wall as you push your upper back away from the wall. You should feel your shoulder blades sliding forward around your ribcage. Keep your elbows and your head still.  ¨ If you are not sure that you are doing this exercise correctly, ask your health care provider for more instructions.  4. Hold for __________ seconds.  5. Slowly return to the starting position. Let your muscles relax completely before you repeat this exercise.  Repeat __________ times. Complete this exercise __________ times a day.  Exercise E: Shoulder extension, prone  1. Lie on your abdomen on a firm surface so your left / right arm hangs over the edge.  2. Hold a __________ weight in your hand so your  palm faces in toward your body. Your arm should be straight.  3. Squeeze your shoulder blade down toward the middle of your back.  4. Slowly raise your arm behind you and toward the ceiling, up to the height of the surface that you are lying on. Keep your arm straight and keep your shoulder blade tucked back.  5. Hold for __________ seconds.  6. Slowly return to the starting position and relax your muscles.  Repeat __________ times. Complete this exercise __________ times a day.  This information is not intended to replace advice given to you by your health care provider. Make sure you discuss any questions you have with your health care provider.  Document Released: 12/18/2006 Document Revised: 08/24/2017 Document Reviewed: 11/19/2016  Elsevier Interactive Patient Education © 2017 [employer]

## 2018-09-11 NOTE — PROGRESS NOTES
"Subjective:   Emir Aguilar is a 23 y.o. male who presents for Shoulder Pain (WC/Fv)    DOI 9/7/18: Pt was seen in ED: Emir Aguilar is a 23 y.o. left-hand-dominant male with a history of multiple shoulder dislocations who presents to the Emergency Department complaining of left shoulder pain onset earlier today while he was at work. He was on a rack reaching for a tape gun six feet above him when he sneezed and hit his left hand on a shelf. He felt a pop in his left shoulder and experienced immediate pain. He cannot move his left shoulder secondary to pain. No further injuries.     9/11/18: Pt shoulder reduced in ED with improvement. Pt not currently treating pain. Pt is still having pain with ROM. Denies numbness or tingling in upper extremity. Pain is described as a 4/10. Denies fever, chills, erythema, warmth.    HPI  ROS    PMH:  has no past medical history of Allergy or ASTHMA.  MEDS: No current outpatient prescriptions on file.  ALLERGIES: No Known Allergies  SURGHX: History reviewed. No pertinent surgical history.  SOCHX:  reports that he has been smoking Cigarettes.  He has never used smokeless tobacco. He reports that he drinks alcohol. He reports that he uses drugs, including Oral.  History reviewed. No pertinent family history.     Objective:   /68   Pulse 67   Temp 37.1 °C (98.7 °F)   Resp 18   Ht 1.753 m (5' 9\")   Wt 69.9 kg (154 lb)   SpO2 97%   BMI 22.74 kg/m²     Physical Exam   Constitutional: He is oriented to person, place, and time. He appears well-developed and well-nourished. No distress.   HENT:   Head: Normocephalic and atraumatic.   Eyes: Pupils are equal, round, and reactive to light. Conjunctivae are normal.   Cardiovascular: Normal rate and regular rhythm.    Pulmonary/Chest: Effort normal and breath sounds normal.   Musculoskeletal:        Left shoulder: He exhibits decreased range of motion and tenderness. He exhibits no bony tenderness, no swelling, no effusion, " no crepitus and normal strength.   Pain with ROM, n/v intact. Cap refill < 2 sec   Neurological: He is alert and oriented to person, place, and time.   Skin: Skin is warm and dry.   Psychiatric: He has a normal mood and affect. His behavior is normal.   Vitals reviewed.        Assessment/Plan:     1. Anterior dislocation of left shoulder, subsequent encounter         Pt directed RICE and OTC Ibuprofen/Naproxen as needed for pain. Light stretches as tolerated, educational handout provided. Follow up in  x 3 days, work restrictions and D-39 provided. If sx worsen or persist return to clinic we can consider referral to sports med or PT.     Follow-up with primary care provider within 7-10 days. Red flags and emergency room precautions discussed.  Patient appears understanding of information.

## 2018-09-11 NOTE — LETTER
"[facility] [address] - [place] [address]  Phone:  [phone] - Fax:  [phone]   [facility] Progress Report and Disability Certification  Date of Service: 9/11/2018   No Show:  No  Date / Time of Next Visit: 9/14/2018@3:00PM   Claim Information   Patient Name: Emir Aguilar  Claim Number:     Employer: [employer]  Date of Injury: 9/7/2018     Insurer / TPA: [employer]  ID / SSN:     Occupation:   Diagnosis: The encounter diagnosis was Anterior dislocation of left shoulder, subsequent encounter.    Medical Information   Related to Industrial Injury? Yes    Subjective Complaints:  DOI 9/7/18: Pt was seen in ED: Emir Aguilar is a 23 y.o. left-hand-dominant male with a history of multiple shoulder dislocations who presents to the Emergency Department complaining of left shoulder pain onset earlier today while he was at work. He was on a rack reaching for a tape gun six feet above him when he sneezed and hit his left hand on a shelf. He felt a pop in his left shoulder and experienced immediate pain. He cannot move his left shoulder secondary to pain. No further injuries.     9/11/18: Pt shoulder reduced in ED with improvement. Pt not currently treating pain. Pt is still having pain with ROM. Denies numbness or tingling in upper extremity. Pain is described as a 4/10. Denies fever, chills, erythema, warmth.    Objective Findings: /68   Pulse 67   Temp 37.1 °C (98.7 °F)   Resp 18   Ht 1.753 m (5' 9\")   Wt 69.9 kg (154 lb)   SpO2 97%   BMI 22.74 kg/m²     Physical Exam   Constitutional: He is oriented to person, place, and time. He appears well-developed and well-nourished. No distress.   HENT:   Head: Normocephalic and atraumatic.   Eyes: Pupils are equal, round, and reactive to light. Conjunctivae are normal.   Cardiovascular: Normal rate and regular rhythm.    Pulmonary/Chest: Effort normal " and breath sounds normal.   Musculoskeletal:        Left shoulder: He exhibits decreased range of motion and tenderness. He exhibits no bony tenderness, no swelling, no effusion, no crepitus and normal strength.   Pain with ROM, n/v intact. Cap refill < 2 sec   Neurological: He is alert and oriented to person, place, and time.   Skin: Skin is warm and dry.   Psychiatric: He has a normal mood and affect. His behavior is normal.   Vitals reviewed.     Pre-Existing Condition(s): Previous hx of shoulder dislocation   Assessment:   Condition Improved    Status: Additional Care Required  Permanent Disability:No    Plan: Medication  Comments:Ibuprofen/Naproxen    Diagnostics: X-ray  Comments:Anterior shoulder dislocation    Comments:       Disability Information   Status: Released to Restricted Duty    From:  9/11/2018  Through: 9/14/2018 Restrictions are: Temporary   Physical Restrictions   Sitting:    Standing:    Stooping:    Bending:      Squatting:    Walking:    Climbing:    Pushing:  < or = to 1 hr/day   Pulling:    Other:    Reaching Above Shoulder (L): 0 hrs/day Reaching Above Shoulder (R):       Reaching Below Shoulder (L):    Reaching Below Shoulder (R):      Not to exceed Weight Limits   Carrying(hrs):   Weight Limit(lb): < or = to 10 pounds Lifting(hrs):   Comments:L upper extremity  Weight  Limit(lb): < or = to 10 pounds   Comments:      Repetitive Actions   Hands: i.e. Fine Manipulations from Grasping:     Feet: i.e. Operating Foot Controls:     Driving / Operate Machinery:     Physician Name: Mona Roque P.A.-C. Physician Signature: MONA Ware P.A.-C. e-Signature: Dr. Lux Marie, Medical Director   Clinic Name / Location: [address]  [place] [address] Clinic Phone Number: Dept: [phone]   Appointment Time: 3:30 Pm Visit Start Time: 3:36 PM   Check-In Time:  3:33 Pm Visit Discharge Time:  4:40PM   Original-Treating Physician or  Chiropractor    Page 2-Insurer/TPA    Page 3-Employer    Page 4-Employee

## 2018-09-11 NOTE — LETTER
"EMPLOYEE’S CLAIM FOR COMPENSATION/ REPORT OF INITIAL TREATMENT  FORM C-4    EMPLOYEE’S CLAIM - PROVIDE ALL INFORMATION REQUESTED   First Name  Emir Last Name  Jeff Birthdate                    1995                Sex  male Claim Number   Home Address  [address] Age  23 y.o. Height  1.753 m (5' 9\") Weight  69.9 kg (154 lb) N     [facility] Zip  [address] Telephone  [phone] (home)    Mailing Address  [address] [facility] Zip  [address] Primary Language Spoken  English    Insurer   Third Party   [employer]   Employee's Occupation (Job Title) When Injury or Occupational Disease Occurred      Employer's Name  [employer]  Telephone  [phone]    Employer Address  [address] [facility]  Zip  [address]    Date of Injury  9/7/2018               Hour of Injury  6:15 PM Date Employer Notified  9/7/2018 Last Day of Work after Injury or Occupational Disease  9/7/2018 Supervisor to Whom Injury Reported  Jennifer   Address or Location of Accident (if applicable)  [[place]]   What were you doing at the time of accident? (if applicable)  putting up my tape gun    How did this injury or occupational disease occur? (Be specific an answer in detail. Use additional sheet if necessary)  I was putting back my tape gun on the shelf and sneezed. My shoulder popped out   If you believe that you have an occupational disease, when did you first have knowledge of the disability and it relationship to your employment?  N/A Witnesses to the Accident  none      Nature of Injury or Occupational Disease  Dislocation  Part(s) of Body Injured or Affected  Shoulder (L), N/A, N/A    I certify that the above is true and correct to the best of my knowledge and that I have provided this information in order to obtain the benefits of " [place]  I hereby authorize any physician, chiropractor, surgeon, practitioner, or other person, any hospital, including [facility] or [facility], any medical service organization, any insurance company, or other institution or organization to release to each other, any medical or other information, including benefits paid or payable, pertinent to this injury or disease, except information relative to diagnosis, treatment and/or counseling for AIDS, psychological conditions, alcohol or controlled substances, for which I must give specific authorization.  A Photostat of this authorization shall be as valid as the original.     Date   Place   Employee’s Signature   THIS REPORT MUST BE COMPLETED AND MAILED WITHIN 3 WORKING DAYS OF TREATMENT   Place  [facility]  Name of Facility  [facility]   Date  9/11/2018 Diagnosis  (S43.015D) Anterior dislocation of left shoulder, subsequent encounter Is there evidence the injured employee was under the influence of alcohol and/or another controlled substance at the time of accident?   Hour  3:36 PM Description of Injury or Disease  The encounter diagnosis was Anterior dislocation of left shoulder, subsequent encounter. No   Treatment  Ibuprofen/Naproxen x 3 days, start light shoulder stretching as tolerated.   Have you advised the patient to remain off work five days or more? No   X-Ray Findings  Positive   If Yes   From Date  To Date      From information given by the employee, together with medical evidence, can you directly connect this injury or occupational disease as job incurred?  Yes If No Full Duty  No Modified Duty  Yes   Is additional medical care by a physician indicated?  Yes If Modified Duty, Specify any Limitations / Restrictions  Please see D-39 for work restrictions   Do you know of any previous injury or disease  "contributing to this condition or occupational disease?                            Yes  Comments:Hx of previous shoulder dislocations   Date  9/11/2018 Print Doctor’s Name Mona Roque P.A.-C. I certify the employer’s copy of  this form was mailed on:   Address  [address] [place] [address]. [address] Insurer’s Use Only     [facility] Zip  [address]    Provider’s Tax ID Number  [id number] Telephone  Dept: [phone]        e-MONA Guy P.A.-C.   e-Signature: Dr. Lux Marie, Medical Director Degree  [employer]        ORIGINAL-TREATING PHYSICIAN OR CHIROPRACTOR    PAGE 2-INSURER/TPA    PAGE 3-EMPLOYER    PAGE 4-EMPLOYEE             Form C-4 (rev10/07)              BRIEF DESCRIPTION OF RIGHTS AND BENEFITS  (Pursuant to [place]RS 616C.050)    Notice of Injury or Occupational Disease (Incident Report Form C-1): If an injury or occupational disease (OD) arises out of and in the  course of employment, you must provide written notice to your employer as soon as practicable, but no later than 7 days after the accident or  OD. Your employer shall maintain a sufficient supply of the required forms.    Claim for Compensation (Form C-4): If medical treatment is sought, the form C-4 is available at the place of initial treatment. A completed  \"Claim for Compensation\" (Form C-4) must be filed within 90 days after an accident or OD. The treating physician or chiropractor must,  within 3 working days after treatment, complete and mail to the employer, the employer's insurer and third-party , the Claim for  Compensation.    Medical Treatment: If you require medical treatment for your on-the-job injury or OD, you may be required to select a physician or  chiropractor from a list provided by your workers’ compensation insurer, if it has contracted with an Organization for Managed Care (MCO) or  Preferred Provider Organization (PPO) or providers of health care. If your employer has not entered into a contract with " an MCO or PPO, you  may select a physician or chiropractor from the Panel of Physicians and Chiropractors. Any medical costs related to your industrial injury or  OD will be paid by your insurer.    Temporary Total Disability (TTD): If your doctor has certified that you are unable to work for a period of at least 5 consecutive days, or 5  cumulative days in a 20-day period, or places restrictions on you that your employer does not accommodate, you may be entitled to TTD  compensation.    Temporary Partial Disability (TPD): If the wage you receive upon reemployment is less than the compensation for TTD to which you are  entitled, the insurer may be required to pay you TPD compensation to make up the difference. TPD can only be paid for a maximum of 24  months.    Permanent Partial Disability (PPD): When your medical condition is stable and there is an indication of a PPD as a result of your injury or  OD, within 30 days, your insurer must arrange for an evaluation by a rating physician or chiropractor to determine the degree of your PPD. The  amount of your PPD award depends on the date of injury, the results of the PPD evaluation and your age and wage.    Permanent Total Disability (PTD): If you are medically certified by a treating physician or chiropractor as permanently and totally disabled  and have been granted a PTD status by your insurer, you are entitled to receive monthly benefits not to exceed 66 2/3% of your average  monthly wage. The amount of your PTD payments is subject to reduction if you previously received a PPD award.    Vocational Rehabilitation Services: You may be eligible for vocational rehabilitation services if you are unable to return to the job due to a  permanent physical impairment or permanent restrictions as a result of your injury or occupational disease.    Transportation and Per Fabian Reimbursement: You may be eligible for travel expenses and per fabian associated with medical  treatment.    Reopening: You may be able to reopen your claim if your condition worsens after claim closure.    Appeal Process: If you disagree with a written determination issued by the insurer or the insurer does not respond to your request, you may  appeal to the Department of Administration, , by following the instructions contained in your determination letter. You must  appeal the determination within 70 days from the date of the determination letter at [address], [address], [place], [place]  [address], or [address], [place], [place] [address]. If you disagree with the  decision, you may appeal to the  Department of Administration, . You must file your appeal within 30 days from the date of the  decision  letter at [address], [address], [place], [place] [address], or [address], [place], [place] [address]. If you  disagree with a decision of an , you may file a petition for judicial review with the District Court. You must do so within 30  days of the Appeal Officer’s decision. You may be represented by an  at your own expense or you may contact the [facility] for possible  representation.    [place]  for Injured Workers (NAIW): If you disagree with a  decision, you may request that NAIW represent you  without charge at an  Hearing. For information regarding denial of benefits, you may contact the [facility] at: [address], [address], [place] [address], [phone], or [address], [address], [place] [address], [phone]    To File a Complaint with the Division: If you wish to file a complaint with the  of the Division of Industrial Relations (DIR),  please contact the Workers’ Compensation Section, [address], [address], [place], [place] [address], telephone [phone], or  [address], [address], [place], [place] [address], telephone [phone].    For assistance with Workers’ Compensation Issues: you may contact the Office of the Governor Consumer Health Assistance, [address] [place] [address], [address], [place], [place] [address], Toll Free 1-539.207.7715, Web site: [email or web address]., E-mail  [email or web address].                                                                                                                                                                                                                                   __________________________________________________________________                                                                   _________________                Employee Name / Signature                                                                                                                                                       Date                                                                                                                                                                                                     D-2 (rev. 10/07)

## 2018-09-14 ENCOUNTER — OCCUPATIONAL MEDICINE (OUTPATIENT)
Dept: URGENT CARE | Facility: PHYSICIAN GROUP | Age: 23
End: 2018-09-14
Payer: COMMERCIAL

## 2018-09-14 VITALS
WEIGHT: 141 LBS | TEMPERATURE: 98.8 F | HEIGHT: 65 IN | HEART RATE: 67 BPM | RESPIRATION RATE: 14 BRPM | SYSTOLIC BLOOD PRESSURE: 112 MMHG | OXYGEN SATURATION: 97 % | DIASTOLIC BLOOD PRESSURE: 64 MMHG | BODY MASS INDEX: 23.49 KG/M2

## 2018-09-14 DIAGNOSIS — S43.015A ANTERIOR DISLOCATION OF LEFT SHOULDER, INITIAL ENCOUNTER: Primary | ICD-10-CM

## 2018-09-14 PROCEDURE — 99213 OFFICE O/P EST LOW 20 MIN: CPT | Mod: 29 | Performed by: PHYSICIAN ASSISTANT

## 2018-09-14 ASSESSMENT — ENCOUNTER SYMPTOMS
DIARRHEA: 0
FALLS: 0
CHILLS: 0
CONSTIPATION: 0
FEVER: 0
VOMITING: 0
ABDOMINAL PAIN: 0
NAUSEA: 0
COUGH: 0

## 2018-09-14 NOTE — LETTER
"   [facility] [address] - [place] [address]  Phone:  [phone] - Fax:  [phone]   [facility] Progress Report and Disability Certification  Date of Service: 9/14/2018   No Show:  No  Date / Time of Next Visit: MMI   Claim Information   Patient Name: Emir Aguilar  Claim Number:     Employer: [employer]  Date of Injury: 9/7/2018     Insurer / TPA: [employer]  ID / SSN:     Occupation:   Diagnosis: The encounter diagnosis was Anterior dislocation of left shoulder, initial encounter.    Medical Information   Related to Industrial Injury? Yes    Subjective Complaints:  DOI 9/7/18: Pt was seen in ED: Emir Aguilar is a 23 y.o. left-hand-dominant male with a history of multiple shoulder dislocations who presents to the Emergency Department complaining of left shoulder pain onset earlier today while he was at work. He was on a rack reaching for a tape gun six feet above him when he sneezed and hit his left hand on a shelf. He felt a pop in his left shoulder and experienced immediate pain. He cannot move his left shoulder secondary to pain. No further injuries.     9/14/18: Pain today decreased to 4/10. Pt ambulating well. Pt currently taking ibuprofen BID. Denies numbness or tingling. Pt ready to return to work full duty. Denies fever, chills, n/v/d. No additional injury.    Objective Findings: /64   Pulse 67   Temp 37.1 °C (98.8 °F)   Resp 14   Ht 1.651 m (5' 5\")   Wt 64 kg (141 lb)   SpO2 97%   BMI 23.46 kg/m²     Physical Exam   Constitutional: He is oriented to person, place, and time. He appears well-developed and well-nourished. No distress.   HENT:   Head: Normocephalic and atraumatic.   Eyes: Pupils are equal, round, and reactive to light. Conjunctivae are normal.   Cardiovascular: Normal rate and regular rhythm.    Pulmonary/Chest: Effort normal and breath sounds normal.   "   Musculoskeletal:        Left shoulder: He exhibits normal range of motion, no bony tenderness, no swelling, no effusion, no crepitus, no deformity, no laceration, no spasm, normal pulse and normal strength.   Neurological: He is alert and oriented to person, place, and time.   Skin: Skin is warm and dry.   Psychiatric: He has a normal mood and affect. His behavior is normal.   Vitals reviewed.     Pre-Existing Condition(s):     Assessment:   Condition Improved    Status: Discharged /  MMI  Permanent Disability:No    Plan: Medication  Comments:Ibuprofen as needed for pain and continue stretching.     Diagnostics: X-ray  Comments:Anterior dislocation     Comments:       Disability Information   Status: Released to Full Duty    From:  9/14/2018  Through:   Restrictions are:     Physical Restrictions   Sitting:    Standing:    Stooping:    Bending:      Squatting:    Walking:    Climbing:    Pushing:      Pulling:    Other:    Reaching Above Shoulder (L):   Reaching Above Shoulder (R):       Reaching Below Shoulder (L):    Reaching Below Shoulder (R):      Not to exceed Weight Limits   Carrying(hrs):   Weight Limit(lb):   Lifting(hrs):   Weight  Limit(lb):     Comments:      Repetitive Actions   Hands: i.e. Fine Manipulations from Grasping:     Feet: i.e. Operating Foot Controls:     Driving / Operate Machinery:     Physician Name: Mona Roque P.A.-C. Physician Signature: MONA Ware P.A.-C. e-Signature: Dr. Lux Marie, Medical Director   Clinic Name / Location: [address]  [place] [address] Clinic Phone Number: Dept: [phone]   Appointment Time: 3:00 Pm Visit Start Time: 3:10 PM   Check-In Time:  3:01 Pm Visit Discharge Time: 4:00 PM   Original-Treating Physician or Chiropractor    Page 2-Insurer/TPA    Page 3-Employer    Page 4-Employee

## 2018-09-14 NOTE — PROGRESS NOTES
"Subjective:   Emir Aguilar is a 23 y.o. male who presents for Shoulder Pain ([facility] F/U left shoulder injury )    DOI 9/7/18: Pt was seen in ED: Emir Aguilar is a 23 y.o. left-hand-dominant male with a history of multiple shoulder dislocations who presents to the Emergency Department complaining of left shoulder pain onset earlier today while he was at work. He was on a rack reaching for a tape gun six feet above him when he sneezed and hit his left hand on a shelf. He felt a pop in his left shoulder and experienced immediate pain. He cannot move his left shoulder secondary to pain. No further injuries.     9/14/18: Pain today decreased to 4/10. Pt ambulating well. Pt currently taking ibuprofen BID. Denies numbness or tingling. Pt ready to return to work full duty. Denies fever, chills, n/v/d. No additional injury.       Review of Systems   Constitutional: Negative for chills, fever and malaise/fatigue.   Respiratory: Negative for cough.    Gastrointestinal: Negative for abdominal pain, constipation, diarrhea, nausea and vomiting.   Musculoskeletal: Positive for joint pain. Negative for falls.   All other systems reviewed and are negative.      PMH:  has no past medical history of Allergy or ASTHMA.  MEDS: No current outpatient prescriptions on file.  ALLERGIES: No Known Allergies  SURGHX: History reviewed. No pertinent surgical history.  SOCHX:  reports that he has been smoking Cigarettes.  He has never used smokeless tobacco. He reports that he drinks alcohol. He reports that he uses drugs, including Oral.  History reviewed. No pertinent family history.     Objective:   /64   Pulse 67   Temp 37.1 °C (98.8 °F)   Resp 14   Ht 1.651 m (5' 5\")   Wt 64 kg (141 lb)   SpO2 97%   BMI 23.46 kg/m²     Physical Exam   Constitutional: He is oriented to person, place, and time. He appears well-developed and well-nourished. No distress.   HENT:   Head: Normocephalic and atraumatic.   Eyes: Pupils are equal, " round, and reactive to light. Conjunctivae are normal.   Cardiovascular: Normal rate and regular rhythm.    Pulmonary/Chest: Effort normal and breath sounds normal.   Musculoskeletal:        Left shoulder: He exhibits normal range of motion, no bony tenderness, no swelling, no effusion, no crepitus, no deformity, no laceration, no spasm, normal pulse and normal strength.   Neurological: He is alert and oriented to person, place, and time.   Skin: Skin is warm and dry.   Psychiatric: He has a normal mood and affect. His behavior is normal.   Vitals reviewed.      Assessment/Plan:     1. Anterior dislocation of left shoulder, initial encounter       Continue shoulder stretches as tolerated and ibuprofen as needed for pain. Pt requesting to be release back to MMI, D-39 provided. If sx persist or worsen return to clinic for sports med or  PT referral. Follow-up with primary care provider within 7-10 days, emergency room precautions discussed.  Patient appears understanding of information.

## 2019-04-28 ENCOUNTER — APPOINTMENT (OUTPATIENT)
Dept: RADIOLOGY | Facility: MEDICAL CENTER | Age: 24
End: 2019-04-28
Attending: EMERGENCY MEDICINE
Payer: COMMERCIAL

## 2019-04-28 ENCOUNTER — HOSPITAL ENCOUNTER (EMERGENCY)
Facility: MEDICAL CENTER | Age: 24
End: 2019-04-28
Attending: EMERGENCY MEDICINE
Payer: COMMERCIAL

## 2019-04-28 VITALS
SYSTOLIC BLOOD PRESSURE: 114 MMHG | BODY MASS INDEX: 20.15 KG/M2 | OXYGEN SATURATION: 96 % | DIASTOLIC BLOOD PRESSURE: 64 MMHG | RESPIRATION RATE: 16 BRPM | HEIGHT: 69 IN | TEMPERATURE: 97 F | HEART RATE: 61 BPM | WEIGHT: 136.02 LBS

## 2019-04-28 DIAGNOSIS — S43.005A DISLOCATION OF LEFT SHOULDER JOINT, INITIAL ENCOUNTER: Primary | ICD-10-CM

## 2019-04-28 PROCEDURE — 700111 HCHG RX REV CODE 636 W/ 250 OVERRIDE (IP): Performed by: EMERGENCY MEDICINE

## 2019-04-28 PROCEDURE — 73030 X-RAY EXAM OF SHOULDER: CPT | Mod: LT

## 2019-04-28 PROCEDURE — 96375 TX/PRO/DX INJ NEW DRUG ADDON: CPT

## 2019-04-28 PROCEDURE — 99285 EMERGENCY DEPT VISIT HI MDM: CPT

## 2019-04-28 PROCEDURE — 700111 HCHG RX REV CODE 636 W/ 250 OVERRIDE (IP)

## 2019-04-28 PROCEDURE — 96376 TX/PRO/DX INJ SAME DRUG ADON: CPT

## 2019-04-28 PROCEDURE — 96374 THER/PROPH/DIAG INJ IV PUSH: CPT

## 2019-04-28 PROCEDURE — 304561 HCHG STAT O2

## 2019-04-28 PROCEDURE — 23650 CLTX SHO DSLC W/MNPJ WO ANES: CPT

## 2019-04-28 PROCEDURE — 99152 MOD SED SAME PHYS/QHP 5/>YRS: CPT

## 2019-04-28 RX ORDER — PROPOFOL 10 MG/ML
INJECTION, EMULSION INTRAVENOUS
Status: COMPLETED | OUTPATIENT
Start: 2019-04-28 | End: 2019-04-28

## 2019-04-28 RX ORDER — ONDANSETRON 2 MG/ML
INJECTION INTRAMUSCULAR; INTRAVENOUS
Status: COMPLETED | OUTPATIENT
Start: 2019-04-28 | End: 2019-04-28

## 2019-04-28 RX ORDER — ONDANSETRON 2 MG/ML
INJECTION INTRAMUSCULAR; INTRAVENOUS
Status: DISCONTINUED
Start: 2019-04-28 | End: 2019-04-28 | Stop reason: HOSPADM

## 2019-04-28 RX ORDER — TRAMADOL HYDROCHLORIDE 50 MG/1
50 TABLET ORAL EVERY 6 HOURS PRN
Qty: 12 TAB | Refills: 0 | Status: SHIPPED | OUTPATIENT
Start: 2019-04-28 | End: 2019-05-01

## 2019-04-28 RX ADMIN — FENTANYL CITRATE 100 MCG: 50 INJECTION, SOLUTION INTRAMUSCULAR; INTRAVENOUS at 04:05

## 2019-04-28 RX ADMIN — ONDANSETRON 4 MG: 2 INJECTION INTRAMUSCULAR; INTRAVENOUS at 04:17

## 2019-04-28 RX ADMIN — FENTANYL CITRATE 100 MCG: 50 INJECTION, SOLUTION INTRAMUSCULAR; INTRAVENOUS at 03:25

## 2019-04-28 RX ADMIN — PROPOFOL 60 MG: 10 INJECTION, EMULSION INTRAVENOUS at 04:11

## 2019-04-28 NOTE — ED TRIAGE NOTES
Pt ambulatory to triage c/o left shoulder dislocation s/p boxing with friends. Pt reports hx of same x 6. + etoh. Distal CMS intact. Pt to room 22.

## 2019-04-28 NOTE — ED NOTES
Pt rounded on, resting in bed, respirations even and unlabored, repositioning self as needed, needs met.

## 2019-04-28 NOTE — ED NOTES
Pt discharged home. Assessment complete. Pt ambulates self. VS stable. Pt verbalized understanding discharge instructions. Prescriptions given pt verbalizes teaching provided. Narcotic consent signed and in chart. Pt verbalizes understanding not to drive after narcotic administration. Pt's friend is on the way to drive pt home.

## 2019-04-28 NOTE — ED NOTES
RT, and assist RN called to BS for procedural sedation as shoulder reduction was unsuccessful without sedation. L. Shoulder reduction performed at bedside by Dr. Eli with Dr. Eli directing conscious sedation. Medications titrated by ERP per MAR. Procedure began at 0411. Pt tolerated well, maintained airway independently and vital signs remained stable. Resuscitation equipment available but was unneeded.     Pt now axo x4, GCS 15. VSS. Shoulder immobilizer being placed by ED tech.

## 2019-04-28 NOTE — ED PROVIDER NOTES
"ED Provider Note    ED Provider Note      Primary care provider: Pcp Pt States None    CHIEF COMPLAINT  Chief Complaint   Patient presents with   • Shoulder Injury     left       HPI  Emir Aguilar is a 23 y.o. male who presents to the Emergency Department with chief complaint of left shoulder dislocation.  Patient was boxing with his friends this evening he states that he went to throw punch felt his left shoulder pop out of place.  This shoulder has been dislocated several times before.  He reports no direct injury to the shoulder he has no pain in any other extremities no head injury moderate at this time worse with any movement of the left shoulder better with rest    REVIEW OF SYSTEMS  10 systems reviewed and otherwise negative, pertinent positives and negatives listed in the history of present illness.    PAST MEDICAL HISTORY       SURGICAL HISTORY  patient denies any surgical history    SOCIAL HISTORY  Social History   Substance Use Topics   • Smoking status: Current Every Day Smoker     Packs/day: 0.25     Types: Cigarettes   • Smokeless tobacco: Never Used   • Alcohol use Yes      Comment: occ      History   Drug Use   • Types: Inhaled     Comment: marijuana       FAMILY HISTORY  Non-Contributory    CURRENT MEDICATIONS  Home Medications     Reviewed by Krissy Villa R.N. (Registered Nurse) on 04/28/19 at 0306  Med List Status: Complete   Medication Last Dose Status        Patient Chester Taking any Medications                       ALLERGIES  No Known Allergies    PHYSICAL EXAM  VITAL SIGNS: /54   Pulse 80   Temp 36.1 °C (97 °F) (Temporal)   Resp 17   Ht 1.753 m (5' 9\")   Wt 61.7 kg (136 lb 0.4 oz)   SpO2 94%   BMI 20.09 kg/m²   Pulse ox interpretation: I interpret this pulse ox as normal.  Constitutional: Alert and oriented x 3, minimal distress  HEENT: Atraumatic normocephalic, pupils are equal round reactive to light extraocular movements are intact. The nares is clear, external ears are " normal, mouth shows moist mucous membranes  Neck: Supple, no JVD no tracheal deviation  Cardiovascular: Regular rate and rhythm no murmur rub or gallop 2+ pulses peripherally x4  Thorax & Lungs: No respiratory distress, no wheezes rales or rhonchi, No chest tenderness.   GI: Soft nontender nondistended positive bowel sounds, no peritoneal signs  Skin: Warm dry no acute rash or lesion  Musculoskeletal: Right upper and bilateral lower extremities are unremarkable, left upper extremity held in the flexed abducted position there is obvious fullness anterior and inferiorly with prominent acromion and glenoid slight numbness over the axillary distribution.  No pain at the elbow no pain with flexion extension the elbow normal sensation left hand.  Neurologic: Cranial nerves III through XII are grossly intact, no sensory deficit, no cerebellar dysfunction   Psychiatric: Appropriate affect for situation at this time      DIAGNOSTIC STUDIES / PROCEDURES        RADIOLOGY  DX-SHOULDER 2+ LEFT   Final Result         Limited exam due to positioning.      Relocation of the left glenohumeral joint.      DX-SHOULDER 2+ LEFT   Final Result         Anterior inferior shoulder dislocation.        The radiologist's interpretation of all radiological studies have been reviewed by me.    COURSE & MEDICAL DECISION MAKING  Pertinent Labs & Imaging studies reviewed. (See chart for details)    3:31 AM - Patient seen and examined at bedside.  Obvious anterior inferior shoulder dislocation will obtain shoulder to assure no unstable fracture.      Conscious Sedation Procedure Note    Indication: shoulder dislocation    Consent: I have discussed with the patient and/or the patient representative the indication, alternatives, and the possible risks and/or complications of the planned procedure and the anesthesia methods. The patient and/or patient representative appear to understand and agree to proceed.    Physician Involvement: The attending  physician was present and supervising this procedure.    Pre-Sedation Documentation and Exam: I have personally completed a history, physical exam & review of systems for this patient (see notes).  Airway Assessment: normal    Prior History of Anesthesia Complications: none    ASA Classification: Class 1 - A normal healthy patient    Sedation/ Anesthesia Plan: intravenous sedation    Medications Used: propofol intravenously    Monitoring and Safety: The patient was placed on a cardiac monitor and vital signs, pulse oximetry and level of consciousness were continuously evaluated throughout the procedure. The patient was closely monitored until recovery from the medications was complete and the patient had returned to baseline status. Respiratory therapy was on standby at all times during the procedure.    (The following sections must be completed)  Post-Sedation Vital Signs: Vital signs were reviewed and were stable after the procedure (see flow sheet for vitals)            Post-Sedation Exam: Lungs: clear           Complications: none    Joint Reduction Procedure Note    Indication: Joint dislocation    Consent: The patient was counseled regarding the procedure, it's indications, risks, potential complications and alternatives and any questions were answered. Consent was obtained.    Procedure: The pre-reduction exam showed distal perfusion & neurologic function to be normal. The patient was placed in the appropriate position. Anesthesia/pain control was obtained using conscious sedation with propofol intravenously. Reduction of the left shoulder was performed by traction and counter traction, scapular manipulation and external rotation. Post reduction films were obtained and revealed satisfactory reduction. A post-reduction exam revealed distal perfusion & neurologic function to be normal. The affected area was immobilized with a shoulder immobilizer.    The patient tolerated the procedure well.    Complications:  "None          Initial x-ray as above.  Attempted to relocate with IV fentanyl as patient said several dislocations in the past this is unsuccessful, procedural sedation initiated after 1 dose of propofol we achieved muscular relaxation the shoulder was easily Roeck located he woke uneventfully x-rays demonstrate appropriate reduction placed in shoulder immobilizer still has minimal numbness over the axillary nerve distribution follow-up with orthopedics return for worsening pain numbness tingling weakness any other acute symptoms or concerns otherwise discharged in stable and improved condition.    Patient noted to have slightly elevated blood pressure likely circumstantial secondary to presenting complaint. Referred to primary care physician for further evaluation.        /54   Pulse 80   Temp 36.1 °C (97 °F) (Temporal)   Resp 17   Ht 1.753 m (5' 9\")   Wt 61.7 kg (136 lb 0.4 oz)   SpO2 94%   BMI 20.09 kg/m²     Evi Castillo M.D.  [address] [facility] [id number] [phone]          [facility], Emergency Dept  [address] [facility] [address] [phone]    If symptoms worsen    Prescription monitoring program queried and unremarkable.  Patient counseled on the risks of controlled substances including potential risks and benefits proper use alternative treatments, cause the symptoms, provisions of treatment plan, risk of dependence addiction and overdose method safely dispose of the medication, the fact that they would be given no refills from the emergency department.     In prescribing controlled substances to this patient, I certify that I have obtained and reviewed the medical history of Emir Aguilar. I have also made a good earl effort to obtain applicable records from other providers who have treated the patient and records did not demonstrate any increased risk of substance abuse that would prevent me from prescribing controlled substances. "     I have conducted a physical exam and documented it. I have reviewed Mr. Aguilar’s prescription history as maintained by the [place] Prescription Monitoring Program.     I have assessed the patient’s risk for abuse, dependency, and addiction using the validated Opioid Risk Tool available at https://www.mdcalc.com/czduim-djws-vjhl-ort-narcotic-abuse.     Given the above, I believe the benefits of controlled substance therapy outweigh the risks. The reasons for prescribing controlled substances include non-narcotic, oral analgesic alternatives have been inadequate for pain control. Accordingly, I have discussed the risk and benefits, treatment plan, and alternative therapies with the patient.         New Prescriptions    TRAMADOL (ULTRAM) 50 MG TAB    Take 1 Tab by mouth every 6 hours as needed for Moderate Pain for up to 3 days.       FINAL IMPRESSION  1. Dislocation of left shoulder joint, initial encounter Active    2.  Procedural sedation  3.  Shoulder reduction      This dictation has been created using voice recognition software and/or scribes. The accuracy of the dictation is limited by the abilities of the software and the expertise of the scribes. I expect there may be some errors of grammar and possibly content. I made every attempt to manually correct the errors within my dictation. However, errors related to voice recognition software and/or scribes may still exist and should be interpreted within the appropriate context.

## 2019-05-03 ENCOUNTER — OFFICE VISIT (OUTPATIENT)
Dept: URGENT CARE | Facility: PHYSICIAN GROUP | Age: 24
End: 2019-05-03
Payer: COMMERCIAL

## 2019-05-03 VITALS
HEIGHT: 69 IN | TEMPERATURE: 98.6 F | SYSTOLIC BLOOD PRESSURE: 118 MMHG | BODY MASS INDEX: 21.48 KG/M2 | WEIGHT: 145 LBS | HEART RATE: 86 BPM | OXYGEN SATURATION: 96 % | DIASTOLIC BLOOD PRESSURE: 82 MMHG

## 2019-05-03 DIAGNOSIS — S43.005D DISLOCATION OF LEFT SHOULDER JOINT, SUBSEQUENT ENCOUNTER: ICD-10-CM

## 2019-05-03 DIAGNOSIS — M25.512 ACUTE PAIN OF LEFT SHOULDER: ICD-10-CM

## 2019-05-03 PROCEDURE — 99213 OFFICE O/P EST LOW 20 MIN: CPT | Performed by: PHYSICIAN ASSISTANT

## 2019-05-03 ASSESSMENT — ENCOUNTER SYMPTOMS
NEUROLOGICAL NEGATIVE: 1
CARDIOVASCULAR NEGATIVE: 1
RESPIRATORY NEGATIVE: 1
CONSTITUTIONAL NEGATIVE: 1

## 2019-05-03 NOTE — LETTER
May 3, 2019         Patient: Emir Aguilar   YOB: 1995   Date of Visit: 5/3/2019           To Whom it May Concern:    Emir Aguilar was seen in my clinic on 5/3/2019.  He may return to work on 05/08/19    If you have any questions or concerns, please don't hesitate to call.        Sincerely,           Yesica Clayton P.A.-C.  Electronically Signed

## 2019-05-03 NOTE — PROGRESS NOTES
"Subjective:      Emir Aguilar is a 23 y.o. male who presents with Shoulder Injury (L shoulder injury, x1 week pt states he is better but he need to be cleared for work )        Shoulder Injury      Patient presents today for about 1 week of significantly improved left shoulder pain.   Patient suffered from a dislocation and was seen in ED where it was reduced.  Post reduction films were WNL.    He notes that his ROM is much better and notes a mild ache with extremes of movement. He has been out of work for a week and would like to return next week but needs a note of clearance. Denies numbness, tingling or weakness of  strength.     Review of Systems   Constitutional: Negative.    Respiratory: Negative.    Cardiovascular: Negative.    Musculoskeletal:        SEE HPI   Skin: Negative.    Neurological: Negative.    Endo/Heme/Allergies: Negative.        PMH:  has no past medical history of Allergy or ASTHMA.  MEDS: No current outpatient prescriptions on file.  ALLERGIES: No Known Allergies  SURGHX: History reviewed. No pertinent surgical history.  SOCHX:  reports that he has been smoking Cigarettes.  He has been smoking about 0.25 packs per day. He has never used smokeless tobacco. He reports that he drinks alcohol. He reports that he uses drugs, including Inhaled.  FH: Family history was reviewed, no pertinent findings to report   Objective:     /82 (BP Location: Left arm, Patient Position: Sitting, BP Cuff Size: Adult)   Pulse 86   Temp 37 °C (98.6 °F) (Temporal)   Ht 1.753 m (5' 9\")   Wt 65.8 kg (145 lb)   SpO2 96%   BMI 21.41 kg/m²      Physical Exam   Constitutional: He is oriented to person, place, and time. He appears well-developed and well-nourished. No distress.   HENT:   Head: Normocephalic and atraumatic.   Eyes: Conjunctivae and EOM are normal.   Neck: Normal range of motion. Neck supple.   Cardiovascular: Normal rate and regular rhythm.    Pulmonary/Chest: Effort normal and breath " sounds normal.   Musculoskeletal:        Left shoulder: He exhibits decreased range of motion (mild pain with abduction > 90 degrees only. ). He exhibits no tenderness, no bony tenderness, normal pulse and normal strength.        Arms:  Neurological: He is alert and oriented to person, place, and time.   Skin: Skin is warm and dry.   Psychiatric: He has a normal mood and affect. His behavior is normal.   Vitals reviewed.          Assessment/Plan:     1. Acute pain of left shoulder      resolving     2. Dislocation of left shoulder joint, subsequent encounter      x 1 week ago.          -patient has hx of left shoulder dislocation.  Exam is benign with mild pain with abduction > 90 degrees.  He notes he will lift 20 pounds at work repetitively as main more strenuous activity daily.  He is to take Ibuprofen/Aleve, gentle ROM and given note that he may return to work on 05/08/19 if pain free.  If any recurrence of pain he is to follow up in clinic/refer to Ortho.       Supportive care, differential diagnoses, and indications for immediate follow-up discussed with patient.   Pathogenesis of diagnosis discussed including typical length and natural progression.   Instructed to return to clinic or nearest emergency department for any change in condition, further concerns, or worsening of symptoms.  Patient states understanding of the plan of care and discharge instructions.      Yesica Claytno P.A.-C.

## 2020-09-07 ENCOUNTER — APPOINTMENT (OUTPATIENT)
Dept: RADIOLOGY | Facility: MEDICAL CENTER | Age: 25
End: 2020-09-07
Attending: EMERGENCY MEDICINE
Payer: COMMERCIAL

## 2020-09-07 ENCOUNTER — HOSPITAL ENCOUNTER (EMERGENCY)
Facility: MEDICAL CENTER | Age: 25
End: 2020-09-07
Attending: EMERGENCY MEDICINE
Payer: COMMERCIAL

## 2020-09-07 VITALS
BODY MASS INDEX: 19 KG/M2 | RESPIRATION RATE: 18 BRPM | DIASTOLIC BLOOD PRESSURE: 63 MMHG | WEIGHT: 128.31 LBS | SYSTOLIC BLOOD PRESSURE: 107 MMHG | TEMPERATURE: 98.1 F | HEIGHT: 69 IN | OXYGEN SATURATION: 100 % | HEART RATE: 45 BPM

## 2020-09-07 DIAGNOSIS — S43.005A DISLOCATION OF LEFT SHOULDER JOINT, INITIAL ENCOUNTER: ICD-10-CM

## 2020-09-07 PROCEDURE — 73030 X-RAY EXAM OF SHOULDER: CPT | Mod: LT

## 2020-09-07 PROCEDURE — 96375 TX/PRO/DX INJ NEW DRUG ADDON: CPT

## 2020-09-07 PROCEDURE — 94770 HCHG CO2 EXPIRED GAS DETERMINATION: CPT

## 2020-09-07 PROCEDURE — 36415 COLL VENOUS BLD VENIPUNCTURE: CPT

## 2020-09-07 PROCEDURE — 700111 HCHG RX REV CODE 636 W/ 250 OVERRIDE (IP): Performed by: EMERGENCY MEDICINE

## 2020-09-07 PROCEDURE — 96374 THER/PROPH/DIAG INJ IV PUSH: CPT

## 2020-09-07 PROCEDURE — 99285 EMERGENCY DEPT VISIT HI MDM: CPT

## 2020-09-07 PROCEDURE — 23650 CLTX SHO DSLC W/MNPJ WO ANES: CPT

## 2020-09-07 RX ORDER — ONDANSETRON 2 MG/ML
4 INJECTION INTRAMUSCULAR; INTRAVENOUS ONCE
Status: COMPLETED | OUTPATIENT
Start: 2020-09-07 | End: 2020-09-07

## 2020-09-07 RX ADMIN — PROPOFOL 50 MG: 10 INJECTION, EMULSION INTRAVENOUS at 11:25

## 2020-09-07 RX ADMIN — PROPOFOL 25 MG: 10 INJECTION, EMULSION INTRAVENOUS at 11:28

## 2020-09-07 RX ADMIN — ONDANSETRON 4 MG: 2 INJECTION INTRAMUSCULAR; INTRAVENOUS at 11:02

## 2020-09-07 RX ADMIN — PROPOFOL 25 MG: 10 INJECTION, EMULSION INTRAVENOUS at 11:26

## 2020-09-07 RX ADMIN — FENTANYL CITRATE 50 MCG: 50 INJECTION INTRAMUSCULAR; INTRAVENOUS at 11:03

## 2020-09-07 SDOH — HEALTH STABILITY: MENTAL HEALTH: HOW MANY STANDARD DRINKS CONTAINING ALCOHOL DO YOU HAVE ON A TYPICAL DAY?: 1 OR 2

## 2020-09-07 SDOH — HEALTH STABILITY: MENTAL HEALTH: HOW OFTEN DO YOU HAVE A DRINK CONTAINING ALCOHOL?: MONTHLY OR LESS

## 2020-09-07 SDOH — HEALTH STABILITY: MENTAL HEALTH: HOW OFTEN DO YOU HAVE 6 OR MORE DRINKS ON ONE OCCASION?: NEVER

## 2020-09-07 ASSESSMENT — PAIN DESCRIPTION - PAIN TYPE: TYPE: ACUTE PAIN

## 2020-09-07 NOTE — ED TRIAGE NOTES
Chief Complaint   Patient presents with   • Shoulder Pain     L shoulder deformity noted, reports dislocated while sleeping. Reports frequent L shoulder dislocations; n/t to L hand, L radial pulse 2+.   • N/V     Patient to triage via ambulaiton, with a steady gait, patient A&O x4.      Explained wait time and triage process to pt. Pt placed back in lobby, told to notify ED tech or triage RN of any changes, verbalized understanding.

## 2020-09-07 NOTE — ED NOTES
Procedural sedation initiated @ 1125 for L shoulder reduction.  Consent signed and placed in the chart prior to sedation.  RN, ED tech, RT and ERP bedside for sedation.  Pt was given a total of 100mg propofol.  L shoulder reduced by ERP and pt placed into shoulder immobilizer.  Pt currently drowsy, does not remember procedure and attempting to remove immobilizer.  VSS throughout and after sedation.  RN bedside to reorient patient and monitor until patient awakens.  VSS.

## 2020-09-07 NOTE — ED PROVIDER NOTES
ED Provider Note    Scribed for Krissy Bay M.D. by Riya Whitfield. 9/7/2020, 10:49 AM.    Primary care provider: Pcp Pt States None  Means of arrival: Walk-in  History obtained from: Patient  History limited by: None    CHIEF COMPLAINT  Chief Complaint   Patient presents with   • Shoulder Pain     L shoulder deformity noted, reports dislocated while sleeping. Reports frequent L shoulder dislocations; n/t to L hand, L radial pulse 2+.   • N/V       HPI  Emir Tracy is a 25 y.o. male who presents to the Emergency Department with constant moderate left shoulder pain this morning. Patient states he was sleeping when he dislocated his shoulder. Patient reports multiple shoulder dislocations in the past. He denies any hand numbness, coolness. No alleviating factors. Patient's pain is exacerbated with range of motion. Patient states he has had propofol in the past with shoulder reductions.  Patient last ate yesterday.    REVIEW OF SYSTEMS  Pertinent positives include left shoulder pain. Pertinent negatives include no hand numbness, coolness, other injuries. All other systems reviewed and negative.     PAST MEDICAL HISTORY   None noted    SURGICAL HISTORY  patient denies any surgical history    SOCIAL HISTORY  Social History     Tobacco Use   • Smoking status: Current Some Day Smoker     Packs/day: 0.25     Types: Cigarettes   • Smokeless tobacco: Never Used   Substance Use Topics   • Alcohol use: Yes     Frequency: Monthly or less     Drinks per session: 1 or 2     Binge frequency: Never     Comment: occ   • Drug use: Yes     Types: Inhaled     Comment: marijuana      Social History     Substance and Sexual Activity   Drug Use Yes   • Types: Inhaled    Comment: marijuana       FAMILY HISTORY  None noted    CURRENT MEDICATIONS  Home Medications     Reviewed by Viktoria Velez R.N. (Registered Nurse) on 09/07/20 at 1019  Med List Status: Complete   Medication Last Dose Status        Patient Chester Taking any  "Medications                       ALLERGIES  No Known Allergies    PHYSICAL EXAM  VITAL SIGNS: /74   Pulse 63   Temp 36.1 °C (97 °F) (Temporal)   Resp 20   Ht 1.753 m (5' 9\")   Wt 58.2 kg (128 lb 4.9 oz)   SpO2 95%   BMI 18.95 kg/m²    Constitutional: Well developed, No acute distress, Non-toxic appearance.   HENT: Normocephalic, Atraumatic, Bilateral external ears normal, Oropharynx moist,  Eyes: PERRL, EOMI, Conjunctiva normal   Neck: Normal range of motion, No tenderness, Supple  Cardiovascular: Normal heart rate, Normal rhythm  Thorax & Lungs: Normal breath sounds, No respiratory distress,    Abdomen: Benign abdominal exam, no guarding no rebound, no tenderness, no distention  Skin: Warm, Dry, No erythema.   Back: No tenderness  Extremities: Intact distal pulses, No edema, Obvious deformities to the left shoulder, pain with any range of motion, +2 radial pulse, good cap refill.     Neurologic: Alert & oriented x 3, Normal motor function, Normal sensory function, No focal deficits noted.  Psychiatric: Appropriate                                                  DIAGNOSTIC STUDIES / PROCEDURES\    RADIOLOGY  DX-SHOULDER 2+ LEFT   Final Result      Interval reduction of left shoulder dislocation.      DX-SHOULDER 2+ LEFT   Final Result      Anterior dislocation of LEFT shoulder, without associated fracture.        The radiologist's interpretation of all radiological studies have been reviewed by me.    Conscious Sedation Procedure    Indication: shoulder dislocation    Consent: I have discussed with the patient and/or the patient representative the indication, alternatives, and the possible risks and/or complications of the planned procedure and the anesthesia methods. The patient and/or patient representative appear to understand and agree to proceed.    Physician Involvement: The attending physician was present and supervising this procedure.    Pre-Sedation Documentation and Exam: I have personally " completed a history, physical exam & review of systems for this patient (see notes).  Airway Assessment: normal    Prior History of Anesthesia Complications: none    ASA Classification: Class 1 - A normal healthy patient    Sedation/ Anesthesia Plan: intravenous sedation    Medications Used: fentanyl intravenously prior to the procedure for pain control and propofol intravenously for the procedure.    Monitoring and Safety: The patient was placed on a cardiac monitor and vital signs, pulse oximetry and level of consciousness were continuously evaluated throughout the procedure. The patient was closely monitored until recovery from the medications was complete and the patient had returned to baseline status. Respiratory therapy was on standby at all times during the procedure.    Post-Sedation Vital Signs: Vital signs were reviewed and were stable after the procedure (see flow sheet for vitals)    Post-Sedation Exam: Lungs: clear           Complications: bradycardia    REDUCTION PROCEDURE NOTE:  Patient identification was confirmed, consent was obtained verbally.  This procedure was performed at 11:32 AM by Dr. Bay.  Site left shoulder  Anesthetic used (type and amt): Propofol and Fentanyl, please see nursing note for details.  Pre-procedure N/V exam normal  # of attempts: 1  Type of splint: shoulder immobilizer.  Pt anesthetized, fx/dislocation reduced successfully. Patient tolerated procedure well without complications. Patient splinted. Post-procedure exam indicates patient is n/v intact distal to the injury site. Post-procedure films show excellent alignment. Patient  returned to baseline prior to disposition. Instructions for care discussed verbally and patient provided with additional written instructions for homecare and f/u.    COURSE & MEDICAL DECISION MAKING  Nursing notes, VS, PMSFHx reviewed in chart.     10:49 AM Patient seen and examined at bedside. The patient presents with left shoulder pain and  the differential diagnosis includes but is not limited to dislocation no neurovascular compromise and no history of trauma to suspect fracture of dislocation. Ordered for Dx-shoulder (left) to evaluate. Patient was treated with Zofran 4 mg, Sublimaze 50 mcg, and Propofol for his symptoms.    11:32 AM - Shoulder reduction under conscious sedation performed at bedside by me. Respiratory at bedside. Patient is slightly bradycardic but otherwise stable.    12:38 PM - Patient was seen at bedside. Post-procedure he is neurovascularly intact and tingling has resolved. He notes he is feeling better following procedure and medications. I updated him on all diagnostic findings as detailed above. I urged him to follow up with Flora Cabrales, for further care of his shoulder. He was advised of all return precautions. Patient verbalizes understanding and agreement to this plan of care.     The patient will return for new or worsening symptoms and is stable at the time of discharge.    The patient is referred to a primary physician for blood pressure management, diabetic screening, and for all other preventative health concerns.    DISPOSITION:  Patient will be discharged home in stable condition.    FOLLOW UP:  Daniel Emanuel M.D.  [address] [facility] [address]  [phone]    Schedule an appointment as soon as possible for a visit   If symptoms worsen, return to the er.    FINAL IMPRESSION  1. Dislocation of left shoulder joint, initial encounter          Riya JOYCE (Scribe), am scribing for, and in the presence of, Krissy Bay M.D..    Electronically signed by: Riya Whitfield ([email or web address]), 9/7/2020    Krissy JOYCE M.D. personally performed the services described in this documentation, as scribed by Riya Whitfield in my presence, and it is both accurate and complete. C.    The note accurately reflects work and decisions made by me.  Krissy Bay M.D.  9/7/2020  2:08 PM

## 2020-09-07 NOTE — ED NOTES
Pt has been awake, alert, ambulatory and VSS x 1 hour.  Pt verbalizes readiness for discharge.  Pt given d/c instructions and f/u info with verbal understanding.  VSS at discharge.  PIV d/c'd with tip intact.  Pt ambulatory from the ED w/ steady gait.  Pt's mom picking him up for a ride home.  All belongings in possession on discharge.  Pt escorted to the lobby by RN.

## 2020-09-07 NOTE — ED NOTES
Pt left sweatshirt in the room.  Attempted to call pt's phone which he left at home per his mother.  Spoke with patient's mother who states patient has not called her and she does not know where he is.  She is made aware that he has been discharged and left a sweatshirt here.

## 2020-09-07 NOTE — ED NOTES
Pt to REI 16 via w/c by RN.  Pt cannot change into a gown, guarding shoulder, obvious deformity.  Pt in t-shirt.  Up for ERP evaluation.

## 2020-09-07 NOTE — DISCHARGE INSTRUCTIONS
I advised sleeping with your shoulder immobilizer for the next several days.  I recommend following up with the orthopedic doctor as you likely need surgery to fix this.  Any return of pain, numbness or coolness in your hand or any other concerns return.

## 2022-05-19 PROBLEM — S43.015A ANTERIOR DISLOCATION OF LEFT SHOULDER: Status: ACTIVE | Noted: 2022-05-19

## 2023-07-16 ENCOUNTER — APPOINTMENT (OUTPATIENT)
Dept: RADIOLOGY | Facility: MEDICAL CENTER | Age: 28
End: 2023-07-16
Attending: STUDENT IN AN ORGANIZED HEALTH CARE EDUCATION/TRAINING PROGRAM
Payer: COMMERCIAL

## 2023-07-16 ENCOUNTER — HOSPITAL ENCOUNTER (EMERGENCY)
Facility: MEDICAL CENTER | Age: 28
End: 2023-07-16
Attending: STUDENT IN AN ORGANIZED HEALTH CARE EDUCATION/TRAINING PROGRAM
Payer: COMMERCIAL

## 2023-07-16 VITALS
DIASTOLIC BLOOD PRESSURE: 57 MMHG | BODY MASS INDEX: 20.73 KG/M2 | WEIGHT: 140 LBS | HEART RATE: 56 BPM | HEIGHT: 69 IN | TEMPERATURE: 97.3 F | RESPIRATION RATE: 16 BRPM | SYSTOLIC BLOOD PRESSURE: 118 MMHG | OXYGEN SATURATION: 94 %

## 2023-07-16 DIAGNOSIS — S00.412A ABRASION OF LEFT EAR, INITIAL ENCOUNTER: ICD-10-CM

## 2023-07-16 DIAGNOSIS — R07.89 CHEST WALL PAIN: ICD-10-CM
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
## 2023-07-16 PROCEDURE — 71046 X-RAY EXAM CHEST 2 VIEWS: CPT

## 2023-07-16 PROCEDURE — 90715 TDAP VACCINE 7 YRS/> IM: CPT | Performed by: STUDENT IN AN ORGANIZED HEALTH CARE EDUCATION/TRAINING PROGRAM

## 2023-07-16 PROCEDURE — 90471 IMMUNIZATION ADMIN: CPT

## 2023-07-16 PROCEDURE — A9270 NON-COVERED ITEM OR SERVICE: HCPCS | Performed by: STUDENT IN AN ORGANIZED HEALTH CARE EDUCATION/TRAINING PROGRAM

## 2023-07-16 PROCEDURE — 99284 EMERGENCY DEPT VISIT MOD MDM: CPT

## 2023-07-16 PROCEDURE — 700111 HCHG RX REV CODE 636 W/ 250 OVERRIDE (IP): Performed by: STUDENT IN AN ORGANIZED HEALTH CARE EDUCATION/TRAINING PROGRAM

## 2023-07-16 PROCEDURE — 700102 HCHG RX REV CODE 250 W/ 637 OVERRIDE(OP): Performed by: STUDENT IN AN ORGANIZED HEALTH CARE EDUCATION/TRAINING PROGRAM

## 2023-07-16 PROCEDURE — 70450 CT HEAD/BRAIN W/O DYE: CPT

## 2023-07-16 RX ORDER — ACETAMINOPHEN 325 MG/1
650 TABLET ORAL ONCE
Status: COMPLETED | OUTPATIENT
Start: 2023-07-16 | End: 2023-07-16

## 2023-07-16 RX ADMIN — ACETAMINOPHEN 650 MG: 325 TABLET, FILM COATED ORAL at 19:24

## 2023-07-16 RX ADMIN — CLOSTRIDIUM TETANI TOXOID ANTIGEN (FORMALDEHYDE INACTIVATED), CORYNEBACTERIUM DIPHTHERIAE TOXOID ANTIGEN (FORMALDEHYDE INACTIVATED), BORDETELLA PERTUSSIS TOXOID ANTIGEN (GLUTARALDEHYDE INACTIVATED), BORDETELLA PERTUSSIS FILAMENTOUS HEMAGGLUTININ ANTIGEN (FORMALDEHYDE INACTIVATED), BORDETELLA PERTUSSIS PERTACTIN ANTIGEN, AND BORDETELLA PERTUSSIS FIMBRIAE 2/3 ANTIGEN 0.5 ML: 5; 2; 2.5; 5; 3; 5 INJECTION, SUSPENSION INTRAMUSCULAR at 20:08
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
## 2023-07-17 NOTE — ED NOTES
Discharge teaching and paperwork provided and all questions/concerns answered. VSS, assessment stable. Patient discharged to the care of Jocelyn GARCIA and ambulated out of the ED in custody.